# Patient Record
Sex: FEMALE | Race: WHITE | NOT HISPANIC OR LATINO | Employment: FULL TIME | URBAN - METROPOLITAN AREA
[De-identification: names, ages, dates, MRNs, and addresses within clinical notes are randomized per-mention and may not be internally consistent; named-entity substitution may affect disease eponyms.]

---

## 2022-01-10 LAB
LEFT EYE DM RETINOPATHY: NEGATIVE
RIGHT EYE DM RETINOPATHY: NEGATIVE

## 2022-01-20 ENCOUNTER — OFFICE VISIT (OUTPATIENT)
Dept: FAMILY MEDICINE | Facility: CLINIC | Age: 62
End: 2022-01-20
Payer: COMMERCIAL

## 2022-01-20 ENCOUNTER — LAB VISIT (OUTPATIENT)
Dept: LAB | Facility: HOSPITAL | Age: 62
End: 2022-01-20
Attending: FAMILY MEDICINE
Payer: COMMERCIAL

## 2022-01-20 VITALS
HEART RATE: 71 BPM | BODY MASS INDEX: 30.01 KG/M2 | WEIGHT: 180.13 LBS | SYSTOLIC BLOOD PRESSURE: 150 MMHG | DIASTOLIC BLOOD PRESSURE: 80 MMHG | OXYGEN SATURATION: 98 % | HEIGHT: 65 IN

## 2022-01-20 DIAGNOSIS — I10 ESSENTIAL HYPERTENSION: ICD-10-CM

## 2022-01-20 DIAGNOSIS — E78.2 MIXED HYPERLIPIDEMIA: ICD-10-CM

## 2022-01-20 DIAGNOSIS — G90.50 RSD (REFLEX SYMPATHETIC DYSTROPHY): ICD-10-CM

## 2022-01-20 DIAGNOSIS — K21.9 GASTROESOPHAGEAL REFLUX DISEASE WITHOUT ESOPHAGITIS: Primary | ICD-10-CM

## 2022-01-20 DIAGNOSIS — E11.9 TYPE 2 DIABETES MELLITUS WITHOUT COMPLICATION, WITHOUT LONG-TERM CURRENT USE OF INSULIN: ICD-10-CM

## 2022-01-20 LAB
ALBUMIN SERPL BCP-MCNC: 4.2 G/DL (ref 3.5–5.2)
ALP SERPL-CCNC: 102 U/L (ref 55–135)
ALT SERPL W/O P-5'-P-CCNC: 19 U/L (ref 10–44)
ANION GAP SERPL CALC-SCNC: 10 MMOL/L (ref 8–16)
AST SERPL-CCNC: 16 U/L (ref 10–40)
BASOPHILS # BLD AUTO: 0.03 K/UL (ref 0–0.2)
BASOPHILS NFR BLD: 0.4 % (ref 0–1.9)
BILIRUB SERPL-MCNC: 0.4 MG/DL (ref 0.1–1)
BUN SERPL-MCNC: 13 MG/DL (ref 6–20)
CALCIUM SERPL-MCNC: 11 MG/DL (ref 8.7–10.5)
CHLORIDE SERPL-SCNC: 104 MMOL/L (ref 95–110)
CHOLEST SERPL-MCNC: 262 MG/DL (ref 120–199)
CHOLEST/HDLC SERPL: 4.1 {RATIO} (ref 2–5)
CO2 SERPL-SCNC: 26 MMOL/L (ref 23–29)
CREAT SERPL-MCNC: 0.7 MG/DL (ref 0.5–1.4)
DIFFERENTIAL METHOD: NORMAL
EOSINOPHIL # BLD AUTO: 0.1 K/UL (ref 0–0.5)
EOSINOPHIL NFR BLD: 1.2 % (ref 0–8)
ERYTHROCYTE [DISTWIDTH] IN BLOOD BY AUTOMATED COUNT: 12.4 % (ref 11.5–14.5)
EST. GFR  (AFRICAN AMERICAN): >60 ML/MIN/1.73 M^2
EST. GFR  (NON AFRICAN AMERICAN): >60 ML/MIN/1.73 M^2
ESTIMATED AVG GLUCOSE: 126 MG/DL (ref 68–131)
GLUCOSE SERPL-MCNC: 117 MG/DL (ref 70–110)
HBA1C MFR BLD: 6 % (ref 4–5.6)
HCT VFR BLD AUTO: 38.7 % (ref 37–48.5)
HDLC SERPL-MCNC: 64 MG/DL (ref 40–75)
HDLC SERPL: 24.4 % (ref 20–50)
HGB BLD-MCNC: 12.4 G/DL (ref 12–16)
IMM GRANULOCYTES # BLD AUTO: 0.01 K/UL (ref 0–0.04)
IMM GRANULOCYTES NFR BLD AUTO: 0.1 % (ref 0–0.5)
LDLC SERPL CALC-MCNC: 169.8 MG/DL (ref 63–159)
LYMPHOCYTES # BLD AUTO: 3 K/UL (ref 1–4.8)
LYMPHOCYTES NFR BLD: 45 % (ref 18–48)
MCH RBC QN AUTO: 28.4 PG (ref 27–31)
MCHC RBC AUTO-ENTMCNC: 32 G/DL (ref 32–36)
MCV RBC AUTO: 89 FL (ref 82–98)
MONOCYTES # BLD AUTO: 0.5 K/UL (ref 0.3–1)
MONOCYTES NFR BLD: 8.1 % (ref 4–15)
NEUTROPHILS # BLD AUTO: 3 K/UL (ref 1.8–7.7)
NEUTROPHILS NFR BLD: 45.2 % (ref 38–73)
NONHDLC SERPL-MCNC: 198 MG/DL
NRBC BLD-RTO: 0 /100 WBC
PLATELET # BLD AUTO: 231 K/UL (ref 150–450)
PMV BLD AUTO: 11.7 FL (ref 9.2–12.9)
POTASSIUM SERPL-SCNC: 4.4 MMOL/L (ref 3.5–5.1)
PROT SERPL-MCNC: 7.2 G/DL (ref 6–8.4)
RBC # BLD AUTO: 4.37 M/UL (ref 4–5.4)
SODIUM SERPL-SCNC: 140 MMOL/L (ref 136–145)
TRIGL SERPL-MCNC: 141 MG/DL (ref 30–150)
WBC # BLD AUTO: 6.67 K/UL (ref 3.9–12.7)

## 2022-01-20 PROCEDURE — 99999 PR PBB SHADOW E&M-NEW PATIENT-LVL III: ICD-10-PCS | Mod: PBBFAC,,, | Performed by: FAMILY MEDICINE

## 2022-01-20 PROCEDURE — 85025 COMPLETE CBC W/AUTO DIFF WBC: CPT | Performed by: FAMILY MEDICINE

## 2022-01-20 PROCEDURE — 99999 PR PBB SHADOW E&M-NEW PATIENT-LVL III: CPT | Mod: PBBFAC,,, | Performed by: FAMILY MEDICINE

## 2022-01-20 PROCEDURE — 99204 PR OFFICE/OUTPT VISIT, NEW, LEVL IV, 45-59 MIN: ICD-10-PCS | Mod: S$GLB,,, | Performed by: FAMILY MEDICINE

## 2022-01-20 PROCEDURE — 83036 HEMOGLOBIN GLYCOSYLATED A1C: CPT | Performed by: FAMILY MEDICINE

## 2022-01-20 PROCEDURE — 99204 OFFICE O/P NEW MOD 45 MIN: CPT | Mod: S$GLB,,, | Performed by: FAMILY MEDICINE

## 2022-01-20 PROCEDURE — 80053 COMPREHEN METABOLIC PANEL: CPT | Performed by: FAMILY MEDICINE

## 2022-01-20 PROCEDURE — 36415 COLL VENOUS BLD VENIPUNCTURE: CPT | Mod: PO | Performed by: FAMILY MEDICINE

## 2022-01-20 PROCEDURE — 80061 LIPID PANEL: CPT | Performed by: FAMILY MEDICINE

## 2022-01-20 RX ORDER — METFORMIN HYDROCHLORIDE 500 MG/1
500 TABLET ORAL 2 TIMES DAILY
Qty: 180 TABLET | Refills: 3 | Status: SHIPPED | OUTPATIENT
Start: 2022-01-20 | End: 2023-04-25

## 2022-01-20 RX ORDER — OMEPRAZOLE 20 MG/1
20 CAPSULE, DELAYED RELEASE ORAL 2 TIMES DAILY
Qty: 180 CAPSULE | Refills: 3 | Status: SHIPPED | OUTPATIENT
Start: 2022-01-20 | End: 2023-01-16

## 2022-01-20 RX ORDER — IBUPROFEN, ACETAMINOPHEN 125; 250 MG/1; MG/1
TABLET, FILM COATED ORAL
COMMUNITY

## 2022-01-20 RX ORDER — ATORVASTATIN CALCIUM 40 MG/1
40 TABLET, FILM COATED ORAL DAILY
Qty: 90 TABLET | Refills: 3 | Status: SHIPPED | OUTPATIENT
Start: 2022-01-20 | End: 2022-01-24

## 2022-01-20 RX ORDER — ATORVASTATIN CALCIUM 40 MG/1
40 TABLET, FILM COATED ORAL DAILY
COMMUNITY
End: 2022-01-20 | Stop reason: SDUPTHER

## 2022-01-20 RX ORDER — OMEPRAZOLE 20 MG/1
CAPSULE, DELAYED RELEASE ORAL
COMMUNITY
Start: 2021-09-27 | End: 2022-01-20 | Stop reason: SDUPTHER

## 2022-01-20 RX ORDER — LOSARTAN POTASSIUM 50 MG/1
50 TABLET ORAL DAILY
COMMUNITY
Start: 2022-01-14 | End: 2022-01-20

## 2022-01-20 RX ORDER — LOSARTAN POTASSIUM 50 MG/1
50 TABLET ORAL DAILY
Qty: 90 TABLET | Refills: 3 | Status: SHIPPED | OUTPATIENT
Start: 2022-01-20 | End: 2023-02-06

## 2022-01-20 RX ORDER — ASPIRIN 81 MG/1
81 TABLET ORAL DAILY
COMMUNITY

## 2022-01-20 RX ORDER — METFORMIN HYDROCHLORIDE 500 MG/1
500 TABLET ORAL 2 TIMES DAILY
COMMUNITY
Start: 2022-01-14 | End: 2022-01-20 | Stop reason: SDUPTHER

## 2022-01-20 NOTE — PROGRESS NOTES
HPI: Just moved here. She has come to establish care.  Meds are do for refills.  All problems discussed.  She is assymptomatic for CAD and CHF.          Review of Systems   Constitutional: Negative.    HENT: Negative.    Eyes: Negative.    Respiratory: Negative.    Cardiovascular: Negative.    Gastrointestinal: Negative.    Genitourinary: Negative.    Musculoskeletal: Negative.    Skin: Negative.    Neurological: Negative.    Psychiatric/Behavioral: Negative.         Physical Exam  Constitutional:       Appearance: Normal appearance.   HENT:      Head: Normocephalic and atraumatic.      Nose: Nose normal.      Mouth/Throat:      Mouth: Mucous membranes are dry.      Pharynx: Oropharynx is clear.   Eyes:      Extraocular Movements: Extraocular movements intact.      Pupils: Pupils are equal, round, and reactive to light.   Cardiovascular:      Rate and Rhythm: Normal rate and regular rhythm.      Pulses: Normal pulses.      Heart sounds: Normal heart sounds.   Pulmonary:      Effort: Pulmonary effort is normal.      Breath sounds: Normal breath sounds.   Abdominal:      General: Abdomen is flat. Bowel sounds are normal.      Palpations: Abdomen is soft.   Musculoskeletal:         General: Normal range of motion.      Cervical back: Normal range of motion and neck supple.   Skin:     General: Skin is warm and dry.   Neurological:      General: No focal deficit present.      Mental Status: She is alert and oriented to person, place, and time.   Psychiatric:         Mood and Affect: Mood normal.         Behavior: Behavior normal.         Thought Content: Thought content normal.         Judgment: Judgment normal.          Gastroesophageal reflux disease without esophagitis  -     omeprazole (PRILOSEC) 20 MG capsule; Take 1 capsule (20 mg total) by mouth 2 (two) times a day.  Dispense: 180 capsule; Refill: 3    Mixed hyperlipidemia  -     atorvastatin (LIPITOR) 40 MG tablet; Take 1 tablet (40 mg total) by mouth once  daily. Take once daily  Dispense: 90 tablet; Refill: 3    Essential hypertension  -     losartan (COZAAR) 50 MG tablet; Take 1 tablet (50 mg total) by mouth once daily.  Dispense: 90 tablet; Refill: 3    Type 2 diabetes mellitus without complication, without long-term current use of insulin  -     metFORMIN (GLUCOPHAGE) 500 MG tablet; Take 1 tablet (500 mg total) by mouth 2 (two) times daily.  Dispense: 180 tablet; Refill: 3    RSD (reflex sympathetic dystrophy)  She needs a Handicap parking form.

## 2022-01-21 DIAGNOSIS — E11.9 TYPE 2 DIABETES MELLITUS WITHOUT COMPLICATION, WITHOUT LONG-TERM CURRENT USE OF INSULIN: ICD-10-CM

## 2022-01-21 DIAGNOSIS — E78.2 MIXED HYPERLIPIDEMIA: Primary | ICD-10-CM

## 2022-01-24 DIAGNOSIS — E78.2 MIXED HYPERLIPIDEMIA: Primary | ICD-10-CM

## 2022-01-24 RX ORDER — ROSUVASTATIN CALCIUM 20 MG/1
20 TABLET, COATED ORAL DAILY
Qty: 90 TABLET | Refills: 3 | Status: SHIPPED | OUTPATIENT
Start: 2022-01-24 | End: 2022-08-12 | Stop reason: ALTCHOICE

## 2022-07-07 ENCOUNTER — PATIENT OUTREACH (OUTPATIENT)
Dept: ADMINISTRATIVE | Facility: HOSPITAL | Age: 62
End: 2022-07-07
Payer: COMMERCIAL

## 2022-07-07 ENCOUNTER — PATIENT MESSAGE (OUTPATIENT)
Dept: ADMINISTRATIVE | Facility: HOSPITAL | Age: 62
End: 2022-07-07
Payer: COMMERCIAL

## 2022-07-07 NOTE — PROGRESS NOTES
2022 Care Everywhere updates requested and reviewed.  Immunizations reconciled. Media reports reviewed.  Duplicate HM overrides and  orders removed.  Overdue HM topic chart audit and/or requested.  Overdue lab testing linked to upcoming lab appointments if applies.    Lab daniel, and Mafengwo reviewed   Pap Smear and Lab testing     Not in Links 2022    DIS reviewed      Mammogram     WOG orders placed. New patient      Health Maintenance Due   Topic Date Due    Hepatitis C Screening  Never done    Cervical Cancer Screening  Never done    COVID-19 Vaccine (1) Never done    HIV Screening  Never done    TETANUS VACCINE  Never done    Mammogram  Never done    Colorectal Cancer Screening  Never done    Shingles Vaccine (1 of 2) Never done

## 2022-07-14 ENCOUNTER — LAB VISIT (OUTPATIENT)
Dept: LAB | Facility: HOSPITAL | Age: 62
End: 2022-07-14
Attending: FAMILY MEDICINE
Payer: COMMERCIAL

## 2022-07-14 DIAGNOSIS — E11.9 TYPE 2 DIABETES MELLITUS WITHOUT COMPLICATION, WITHOUT LONG-TERM CURRENT USE OF INSULIN: ICD-10-CM

## 2022-07-14 DIAGNOSIS — E78.2 MIXED HYPERLIPIDEMIA: ICD-10-CM

## 2022-07-14 LAB
CHOLEST SERPL-MCNC: 109 MG/DL (ref 120–199)
CHOLEST/HDLC SERPL: 2 {RATIO} (ref 2–5)
ESTIMATED AVG GLUCOSE: 131 MG/DL (ref 68–131)
HBA1C MFR BLD: 6.2 % (ref 4–5.6)
HDLC SERPL-MCNC: 54 MG/DL (ref 40–75)
HDLC SERPL: 49.5 % (ref 20–50)
LDLC SERPL CALC-MCNC: 32 MG/DL (ref 63–159)
NONHDLC SERPL-MCNC: 55 MG/DL
TRIGL SERPL-MCNC: 115 MG/DL (ref 30–150)

## 2022-07-14 PROCEDURE — 83036 HEMOGLOBIN GLYCOSYLATED A1C: CPT | Performed by: FAMILY MEDICINE

## 2022-07-14 PROCEDURE — 80061 LIPID PANEL: CPT | Performed by: FAMILY MEDICINE

## 2022-07-14 PROCEDURE — 36415 COLL VENOUS BLD VENIPUNCTURE: CPT | Mod: PO | Performed by: FAMILY MEDICINE

## 2022-07-19 ENCOUNTER — TELEPHONE (OUTPATIENT)
Dept: FAMILY MEDICINE | Facility: CLINIC | Age: 62
End: 2022-07-19
Payer: COMMERCIAL

## 2022-07-29 ENCOUNTER — HOSPITAL ENCOUNTER (OUTPATIENT)
Dept: RADIOLOGY | Facility: HOSPITAL | Age: 62
Discharge: HOME | End: 2022-07-29
Attending: FAMILY MEDICINE
Payer: COMMERCIAL

## 2022-07-29 ENCOUNTER — OFFICE VISIT (OUTPATIENT)
Dept: FAMILY MEDICINE | Facility: CLINIC | Age: 62
End: 2022-07-29
Payer: COMMERCIAL

## 2022-07-29 ENCOUNTER — TELEPHONE (OUTPATIENT)
Dept: FAMILY MEDICINE | Facility: CLINIC | Age: 62
End: 2022-07-29

## 2022-07-29 VITALS
HEIGHT: 65 IN | BODY MASS INDEX: 29.27 KG/M2 | OXYGEN SATURATION: 98 % | HEART RATE: 78 BPM | DIASTOLIC BLOOD PRESSURE: 64 MMHG | SYSTOLIC BLOOD PRESSURE: 122 MMHG | RESPIRATION RATE: 18 BRPM | WEIGHT: 175.69 LBS

## 2022-07-29 DIAGNOSIS — Z00.00 WELLNESS EXAMINATION: ICD-10-CM

## 2022-07-29 DIAGNOSIS — K21.9 GASTROESOPHAGEAL REFLUX DISEASE WITHOUT ESOPHAGITIS: ICD-10-CM

## 2022-07-29 DIAGNOSIS — I10 ESSENTIAL HYPERTENSION: ICD-10-CM

## 2022-07-29 DIAGNOSIS — M79.672 PAIN OF LEFT HEEL: ICD-10-CM

## 2022-07-29 DIAGNOSIS — R09.A2 GLOBUS PHARYNGEUS: Primary | ICD-10-CM

## 2022-07-29 DIAGNOSIS — E11.9 TYPE 2 DIABETES MELLITUS WITHOUT COMPLICATION, WITHOUT LONG-TERM CURRENT USE OF INSULIN: ICD-10-CM

## 2022-07-29 PROCEDURE — 73650 X-RAY EXAM OF HEEL: CPT | Mod: 26,LT,, | Performed by: RADIOLOGY

## 2022-07-29 PROCEDURE — 73650 XR CALCANEUS 2 VIEW LEFT: ICD-10-PCS | Mod: 26,LT,, | Performed by: RADIOLOGY

## 2022-07-29 PROCEDURE — 99999 PR PBB SHADOW E&M-EST. PATIENT-LVL V: ICD-10-PCS | Mod: PBBFAC,,, | Performed by: FAMILY MEDICINE

## 2022-07-29 PROCEDURE — 99999 PR PBB SHADOW E&M-EST. PATIENT-LVL V: CPT | Mod: PBBFAC,,, | Performed by: FAMILY MEDICINE

## 2022-07-29 PROCEDURE — 73650 X-RAY EXAM OF HEEL: CPT | Mod: TC,FY,PO,LT

## 2022-07-29 PROCEDURE — 99214 PR OFFICE/OUTPT VISIT, EST, LEVL IV, 30-39 MIN: ICD-10-PCS | Mod: S$GLB,,, | Performed by: FAMILY MEDICINE

## 2022-07-29 PROCEDURE — 99214 OFFICE O/P EST MOD 30 MIN: CPT | Mod: S$GLB,,, | Performed by: FAMILY MEDICINE

## 2022-07-29 RX ORDER — CARBOXYMETHYLCELLULOSE SODIUM 5 MG/ML
1 SOLUTION/ DROPS OPHTHALMIC 3 TIMES DAILY PRN
COMMUNITY

## 2022-07-29 NOTE — TELEPHONE ENCOUNTER
1:40pm:  Pt. Confirmed location of [facility] in [place], [place].  She requests that office request medical records.  Assured her that office will reach out to them to confirm fax #.  She vu.    2:06pm:  Unable to speak to representative to confirm facility's fax # for release of medical records.

## 2022-07-29 NOTE — PROGRESS NOTES
"Subjective:       Patient ID: Daina Britt is a 61 y.o. female.    Chief Complaint: Ankle Pain (Left ankle pain) and Results    Very pleasant 61-year-old patient returns today for follow-up with a couple of other problems.  The patient has reflux which is controlled currently on a PPI.  She does have globus sensation occasionally in her throat which has changed her ability to sing.  The patient's blood pressure is well controlled right now.  No symptoms of CAD or CHF.  Her diabetes type 2 is under excellent control under 6% A1c just recently checked this month.  Her most recent concerns are a nodular like structure on the left lateral heel which is causing her pain with walking.  It has been there for a while.  X-ray and podiatry referral suggested.    Review of Systems   Constitutional: Negative.    HENT: Positive for sore throat.    Eyes: Negative.    Respiratory: Negative.    Cardiovascular: Negative.    Gastrointestinal: Negative.    Endocrine: Negative.    Genitourinary: Negative.    Musculoskeletal: Negative.    Skin: Negative.    Allergic/Immunologic: Negative.    Neurological: Negative.    Hematological: Negative.    Psychiatric/Behavioral: Negative.        Objective:      Vitals:    07/29/22 0928   BP: 122/64   BP Location: Left arm   Patient Position: Sitting   Pulse: 78   Resp: 18   SpO2: 98%   Weight: 79.7 kg (175 lb 11.3 oz)   Height: 5' 5" (1.651 m)      Physical Exam  Vitals and nursing note reviewed.   Constitutional:       Appearance: Normal appearance. She is normal weight.   HENT:      Head: Normocephalic and atraumatic.      Nose: Nose normal.      Mouth/Throat:      Mouth: Mucous membranes are moist.      Pharynx: Oropharynx is clear.   Eyes:      Extraocular Movements: Extraocular movements intact.      Conjunctiva/sclera: Conjunctivae normal.      Pupils: Pupils are equal, round, and reactive to light.   Cardiovascular:      Rate and Rhythm: Normal rate and regular rhythm.      Pulses: " Normal pulses.      Heart sounds: Normal heart sounds.   Pulmonary:      Effort: Pulmonary effort is normal.      Breath sounds: Normal breath sounds.   Abdominal:      General: Abdomen is flat. Bowel sounds are normal.      Palpations: Abdomen is soft.   Musculoskeletal:         General: Normal range of motion.      Cervical back: Normal range of motion and neck supple.      Comments: Nodule on the left lateral heel that is mobile and tender to touch.   Skin:     General: Skin is warm and dry.      Capillary Refill: Capillary refill takes less than 2 seconds.   Neurological:      General: No focal deficit present.      Mental Status: She is alert and oriented to person, place, and time. Mental status is at baseline.   Psychiatric:         Mood and Affect: Mood normal.         Behavior: Behavior normal.         Thought Content: Thought content normal.         Judgment: Judgment normal.         Results for orders placed or performed in visit on 07/14/22   Lipid Panel   Result Value Ref Range    Cholesterol 109 (L) 120 - 199 mg/dL    Triglycerides 115 30 - 150 mg/dL    HDL 54 40 - 75 mg/dL    LDL Cholesterol 32.0 (L) 63.0 - 159.0 mg/dL    HDL/Cholesterol Ratio 49.5 20.0 - 50.0 %    Total Cholesterol/HDL Ratio 2.0 2.0 - 5.0    Non-HDL Cholesterol 55 mg/dL   Hemoglobin A1C   Result Value Ref Range    Hemoglobin A1C 6.2 (H) 4.0 - 5.6 %    Estimated Avg Glucose 131 68 - 131 mg/dL      Assessment:       1. Globus pharyngeus    2. Pain of left heel    3. Type 2 diabetes mellitus without complication, without long-term current use of insulin    4. Gastroesophageal reflux disease without esophagitis    5. Essential hypertension    6. Wellness examination        Plan:       Globus pharyngeus  -     Ambulatory referral/consult to ENT; Future; Expected date: 08/05/2022    Pain of left heel  -     X-Ray Calcaneus 2 View Left; Future; Expected date: 07/29/2022  -     Ambulatory referral/consult to Podiatry; Future; Expected date:  08/05/2022    Type 2 diabetes mellitus without complication, without long-term current use of insulin  Comments:  Controlled.  Hemoglobin A1c and a visit in 6 months recommended continue present plan    Gastroesophageal reflux disease without esophagitis  Comments:  For the most part controlled, globus may be occurring secondary to silent reflux however.  ENT referral set up.    Essential hypertension  Comments:  Blood pressure controlled continue current medications.  Reports values of her consistently over 140/90.    Wellness examination  -     Mammo Digital Diagnostic Bilat with Eb; Future; Expected date: 07/29/2022  -     Ambulatory referral/consult to Gynecology; Future; Expected date: 08/05/2022          Medication List with Changes/Refills   Current Medications    ASPIRIN (ECOTRIN) 81 MG EC TABLET    Take 81 mg by mouth once daily. Take one daily    CARBOXYMETHYLCELLULOSE (REFRESH PLUS) 0.5 % DPET    1 drop 3 (three) times daily as needed.    IBUPROFEN-ACETAMINOPHEN (ADVIL DUAL ACTION) 125-250 MG TAB    Take by mouth every 8 (eight) hours. Take 2 twice a day    LOSARTAN (COZAAR) 50 MG TABLET    Take 1 tablet (50 mg total) by mouth once daily.    METFORMIN (GLUCOPHAGE) 500 MG TABLET    Take 1 tablet (500 mg total) by mouth 2 (two) times daily.    OMEPRAZOLE (PRILOSEC) 20 MG CAPSULE    Take 1 capsule (20 mg total) by mouth 2 (two) times a day.    ROSUVASTATIN (CRESTOR) 20 MG TABLET    Take 1 tablet (20 mg total) by mouth once daily.

## 2022-08-01 ENCOUNTER — TELEPHONE (OUTPATIENT)
Dept: FAMILY MEDICINE | Facility: CLINIC | Age: 62
End: 2022-08-01
Payer: COMMERCIAL

## 2022-08-01 ENCOUNTER — PATIENT MESSAGE (OUTPATIENT)
Dept: ADMINISTRATIVE | Facility: HOSPITAL | Age: 62
End: 2022-08-01
Payer: COMMERCIAL

## 2022-08-03 DIAGNOSIS — E11.9 TYPE 2 DIABETES MELLITUS WITHOUT COMPLICATION: ICD-10-CM

## 2022-08-08 ENCOUNTER — PATIENT OUTREACH (OUTPATIENT)
Dept: ADMINISTRATIVE | Facility: HOSPITAL | Age: 62
End: 2022-08-08
Payer: COMMERCIAL

## 2022-08-08 ENCOUNTER — PATIENT MESSAGE (OUTPATIENT)
Dept: ADMINISTRATIVE | Facility: HOSPITAL | Age: 62
End: 2022-08-08
Payer: COMMERCIAL

## 2022-08-08 NOTE — LETTER
August 16, 2022    Diana Britt  [address] [place] [address]             [facility]  [address]  [facility] [address]  Phone: [phone] Dear Daina,      We have tried to reach you by [facility] [employer] email unsuccessfully.    [facility] wants to help patients achieve their health goals. Our records show that you may have been told you have diabetes.     Diabetes is a medical condition that needs to be managed all the time to reduce your risk of things like heart, kidney and eye disease. Your [facility] primary care team wants to be sure you get important tests and screenings done regularly to assure that your health needs are met.  If you believe this diagnosis of diabetes is in error, please let your primary care physician or care team know so that he/she can update your health record.     Our records indicate you may be overdue for the following:     Topic    Diabetes Urine Screening     Eye Exam          If you recently had any of the above test done at another facility, please let your primary care provider know so that they can update your health record.  Please send a message to your primary care physician via [email or web address] or contact his/her office at [phone]. If you have a copy of these records, please provide a copy so that we may update your records.  Also, You are welcome to request that the report be faxed to us at ([phone]).       If you have an upcoming scheduled appointment for the above test and/or procedures, please disregard this letter.  Otherwise, please send a message via [email or web address] or by calling [phone] and we will assist in scheduling these appointments for you.     Sincerely,     Ervin Garnica MD and your [facility] Primary Care Team

## 2022-08-08 NOTE — PROGRESS NOTES
WEEKLY BULK ORDER REPORT.  ACTIVE PATIENT PORTAL MESSAGE OR LETTER SENT        DIABETIC LAB TESTING- , URINE MICRO,     ALSO DUE FOR EYE EXAM

## 2022-08-09 ENCOUNTER — TELEPHONE (OUTPATIENT)
Dept: PODIATRY | Facility: CLINIC | Age: 62
End: 2022-08-09

## 2022-08-09 ENCOUNTER — OFFICE VISIT (OUTPATIENT)
Dept: PODIATRY | Facility: CLINIC | Age: 62
End: 2022-08-09
Payer: COMMERCIAL

## 2022-08-09 VITALS — HEIGHT: 65 IN | BODY MASS INDEX: 29.16 KG/M2 | WEIGHT: 175 LBS

## 2022-08-09 DIAGNOSIS — M79.89 SOFT TISSUE MASS: ICD-10-CM

## 2022-08-09 DIAGNOSIS — M79.672 PAIN OF LEFT HEEL: ICD-10-CM

## 2022-08-09 DIAGNOSIS — M79.89 MASS OF SOFT TISSUE OF FOOT: Primary | ICD-10-CM

## 2022-08-09 PROCEDURE — 99204 OFFICE O/P NEW MOD 45 MIN: CPT | Mod: S$GLB,,, | Performed by: PODIATRIST

## 2022-08-09 PROCEDURE — 99204 PR OFFICE/OUTPT VISIT, NEW, LEVL IV, 45-59 MIN: ICD-10-PCS | Mod: S$GLB,,, | Performed by: PODIATRIST

## 2022-08-09 NOTE — PATIENT INSTRUCTIONS
PLANTAR FIBROMA    What is a Plantar Fibroma?   A plantar fibroma is a fibrous knot (nodule) in the arch of the foot. It is embedded within the plantar fascia, a band of tissue that extends from the heel to the toes on the bottom of the foot. A plantar fibroma can develop in one or both feet, is benign (non-malignant), and usually will not go away or get smaller without treatment.    Definitive causes for this condition have not been clearly identified.      Signs and Symptoms  The characteristic sign of a plantar fibroma is a noticeable lump in the arch that feels firm to the touch. This mass can remain the same size or get larger over time, or additional fibromas may develop.  People who have a plantar fibroma may or may not have pain. When pain does occur, it is often caused by shoes pushing against the lump in the arch, although it can also arise when walking or standing barefoot.      Diagnosis  To diagnose a plantar fibroma, the foot and ankle surgeon will examine the foot and press on the affected area. Sometimes this can produce pain that extends down to the toes. An MRI or biopsy may be performed to further evaluate the lump and aid in diagnosis.          Treatment Options  Non-surgical treatment may help relieve the pain of a plantar fibroma, although it will not make the mass disappear. The foot and ankle surgeon may select one or more of the following non-surgical options:  Steroid injections. Injecting corticosteroid medication into the mass may help shrink it and thereby relieve the pain that occurs when walking. This reduction may be only temporary and the fibroma could slowly return to its original size.  Orthotic devices. If the fibroma is stable, meaning it is not changing in size, custom orthotic devices (shoe inserts) may relieve the pain by distributing the patients weight away from the fibroma.  Physical therapy. The pain is sometimes treated through physical therapy methods that deliver  anti-inflammatory medication into the fibroma without the need for injection.    If the mass increases in size or pain, the patient should be further evaluated. Surgical treatment to remove the fibroma is considered if the patient continues to experience pain following non-surgical approaches.    Surgical removal of a plantar fibroma may result in a flattening of the arch or development of hammertoes. Orthotic devices may be prescribed to provide support to the foot. Due to the high incidence of recurrence with this condition, continued follow-up with the foot and ankle surgeon is recommended.    Physical therapy. Ultrasound therapy and exercises may help rehabilitate the tendon and muscle following immobilization.  Medications . Nonsteroidal anti inflammatory drugs (NSAIDs), such as ibuprofen, help reduce the pain and inflammation.  Shoe modifications. Your foot and ankle surgeon may advise you on changes to make with your shoes and may provide special inserts designed to improve arch support.      When Is Surgery Needed?  In cases of PTTD that have progressed substantially or have failed to improve with non-surgical treatment, surgery may be required  For some advanced cases, surgery may be the only option. Surgical treatment may include repairing the tendon, realigning the bones of the foot, or both. Your foot and ankle surgeon will determine the best approach for your specific case.

## 2022-08-09 NOTE — PROGRESS NOTES
"  [address]  [place] [address]  Phone: [phone]   [phone]    Patient's PCP:Ervin Garnica MD  Referring Provider: Dr. Ervin Garnica    Subjective:      Chief Complaint:: Heel Pain (Left heel pain)    LIZZ Britt is a 61 y.o. female who presents today with a complaint of left heel pain lasting since 2017. Patient just started feeling the pain over the top of her foot, as well as heel just recently and reports no trauma.  Current symptoms include piercing pain and aching after the end of the day.  Aggravating factors are walking and standing long periods of time. Symptoms have progressed. Treatment to date have included Ibuprofen everyday for years with no relief.     Systemic Doctor: Dr. Garnica  Date Last Seen: 07/29/2022  Blood Sugar: 109  Hemoglobin A1c: 6.2    Vitals:    08/09/22 1344   Weight: 79.4 kg (175 lb)   Height: 5' 5" (1.651 m)   PainSc:   4      Shoe Size: 8    History reviewed. No pertinent surgical history.  History reviewed. No pertinent past medical history.  History reviewed. No pertinent family history.     Social History:   Marital Status: Single  Alcohol History:  reports no history of alcohol use.  Tobacco History:  reports that she has never smoked. She has never used smokeless tobacco.  Drug History:  reports no history of drug use.    Review of patient's allergies indicates:   Allergen Reactions    Titanium analogues Swelling     Titanium screws     Cortisone Other (See Comments)     Cortisone injection  (RSDS)(CRPS)    Codeine Nausea And Vomiting and Rash    Darvon [propoxyphene] Nausea And Vomiting and Rash    Demerol [meperidine] Nausea And Vomiting and Rash    Opioids - morphine analogues Nausea And Vomiting and Rash    Paragoric Nausea And Vomiting and Rash    Wygesic Nausea And Vomiting and Rash       Current Outpatient Medications   Medication Sig Dispense Refill    aspirin (ECOTRIN) 81 MG EC tablet Take 81 mg by mouth once daily. " Take one daily      carboxymethylcellulose (REFRESH PLUS) 0.5 % Dpet 1 drop 3 (three) times daily as needed.      ibuprofen-acetaminophen (ADVIL DUAL ACTION) 125-250 mg Tab Take by mouth every 8 (eight) hours. Take 2 twice a day      losartan (COZAAR) 50 MG tablet Take 1 tablet (50 mg total) by mouth once daily. 90 tablet 3    metFORMIN (GLUCOPHAGE) 500 MG tablet Take 1 tablet (500 mg total) by mouth 2 (two) times daily. 180 tablet 3    omeprazole (PRILOSEC) 20 MG capsule Take 1 capsule (20 mg total) by mouth 2 (two) times a day. 180 capsule 3    rosuvastatin (CRESTOR) 20 MG tablet Take 1 tablet (20 mg total) by mouth once daily. 90 tablet 3     No current facility-administered medications for this visit.       Review of Systems   Constitutional: Negative for chills, fatigue, fever and unexpected weight change.   HENT: Negative for hearing loss and trouble swallowing.    Eyes: Negative for photophobia and visual disturbance.   Respiratory: Negative for cough, shortness of breath and wheezing.    Cardiovascular: Negative for chest pain, palpitations and leg swelling.   Gastrointestinal: Negative for abdominal pain and nausea.   Genitourinary: Negative for dysuria and frequency.   Musculoskeletal: Negative for arthralgias, back pain, gait problem, joint swelling and myalgias.   Skin: Negative for rash and wound.   Neurological: Negative for tremors, seizures, weakness, numbness and headaches.   Hematological: Does not bruise/bleed easily.         Objective:        Physical Exam:   Foot Exam    General  General Appearance: appears stated age and healthy   Orientation: alert and oriented to person, place, and time   Affect: appropriate   Gait: antalgic       Left Foot/Ankle      Inspection and Palpation  Ecchymosis: none  Tenderness: (Plantar lateral heel)  Swelling: none   Arch: normal  Hammertoes: absent  Claw toes: absent  Hallux valgus: no  Hallux limitus: no  Skin Exam: skin intact; no drainage, no ulcer and  no erythema   Neurovascular  Dorsalis pedis: 2+  Posterior tibial: 2+  Capillary refill: 2+  Varicose veins: not present  Saphenous nerve sensation: normal  Tibial nerve sensation: normal  Superficial peroneal nerve sensation: normal  Deep peroneal nerve sensation: normal  Sural nerve sensation: normal    Muscle Strength  Ankle dorsiflexion: 5  Ankle plantar flexion: 5  Ankle inversion: 5  Ankle eversion: 5  Great toe extension: 5  Great toe flexion: 5    Range of Motion    Passive  Ankle dorsiflexion: 0  Ankle eversion: 0  Ankle inversion: 0      Tests  Anterior drawer: negative   Talar tilt: negative   PT Tinel's sign: negative  Paresthesia: negative        Physical Exam  Cardiovascular:      Pulses:           Dorsalis pedis pulses are 2+ on the left side.        Posterior tibial pulses are 2+ on the left side.   Musculoskeletal:      Left foot: No bunion.        Feet:    Feet:      Left foot:      Skin integrity: No ulcer or erythema.                   Muscle Strength   Left Lower Extremity   Ankle Dorsiflexion:  5   Plantar flexion:  5/5     Vascular Exam       Left Pulses  Dorsalis Pedis:      2+  Posterior Tibial:      2+             Imaging: X-Ray Calcaneus 2 View Left  Narrative: EXAMINATION:  XR CALCANEUS 2 VIEW LEFT    CLINICAL HISTORY:  Pain in left foot    TECHNIQUE:  Tangential and lateral views of the left calcaneus were performed.    COMPARISON:  None    FINDINGS:  There are postoperative changes of left posterior subtalar joint arthrodesis utilizing 2 obliquely oriented orthopedic screws.  There is advanced degenerative change of the posterior subtalar joint.  No radiographically evident acute fracture or osseous destructive process.  There are possible postoperative changes of prior left ankle joint arthrodesis with good bony bridging noted across the ankle joint.  There is degenerative change of the talonavicular joint.  Impression: As above    Electronically signed by: Jasvir Lino  MD  Date:    07/29/2022  Time:    12:15             Assessment:       1. Mass of soft tissue of foot    2. Pain of left heel      Plan:   Mass of soft tissue of foot    Pain of left heel  -     Ambulatory referral/consult to Podiatry      Follow up Patient will be scheduled for outpatient surgery.    Procedures        We discussed different conservative and surgical treatment options for the patient's soft tissue mass.  Patient states that given the chronic nature of the problem as well as conservative treatments tried she wished to proceed with surgical intervention.    I discussed with the patient findings of soft tissue mass of her posterior lateral heel.  Discussed that based on physical exam is likely a fibroma.  We discussed different conservative and surgical treatment options.  At this time patient wishes to proceed with surgical intervention.  I did discussed with patient increased risk with her surgery due to her history of RSD on the same foot.    Patient was educated about the entire pre, dorene and post-operative time period.  They  were educated about the pro's and con's of surgery.  All conservative measures have been exhausted. Patient understands the particular risks involved which may occur in connection with the procedure proposed including pain, swelling, infection, stiffness, decreased ROM, recurrence, rejection, numbness, delayed healing, scar formation.    Patient was educated that their diagnosis may be surgically treated.  The patient's problem will probably advance and usually will not get better without surgery.  All of the pre-operative treatment plans have been exhausted or will no longer be successful at this point in time.  Patient was told of the possible outcomes and expectations of the surgical procedure.  They  will need to be followed-up post-operatively.  Today, pictures were drawn, questions answered.      We discussed the following surgical procedures:  Excision of soft tissue  mass left foot       Counseling:     I provided patient education verbally regarding:   Patient diagnosis, treatment options, as well as alternatives, risks, and benefits.     This note was created using Dragon voice recognition software that occasionally misinterpreted phrases or words.

## 2022-08-09 NOTE — H&P (VIEW-ONLY)
"  [address] [facility] [address]  [place] [address]  Phone: [phone]   [phone]    Patient's PCP:Ervin Garnica MD  Referring Provider: Dr. Ervin Garnica    Subjective:      Chief Complaint:: Heel Pain (Left heel pain)    LIZZ Britt is a 61 y.o. female who presents today with a complaint of left heel pain lasting since 2017. Patient just started feeling the pain over the top of her foot, as well as heel just recently and reports no trauma.  Current symptoms include piercing pain and aching after the end of the day.  Aggravating factors are walking and standing long periods of time. Symptoms have progressed. Treatment to date have included Ibuprofen everyday for years with no relief.     Systemic Doctor: Dr. Garnica  Date Last Seen: 07/29/2022  Blood Sugar: 109  Hemoglobin A1c: 6.2    Vitals:    08/09/22 1344   Weight: 79.4 kg (175 lb)   Height: 5' 5" (1.651 m)   PainSc:   4      Shoe Size: 8    History reviewed. No pertinent surgical history.  History reviewed. No pertinent past medical history.  History reviewed. No pertinent family history.     Social History:   Marital Status: Single  Alcohol History:  reports no history of alcohol use.  Tobacco History:  reports that she has never smoked. She has never used smokeless tobacco.  Drug History:  reports no history of drug use.    Review of patient's allergies indicates:   Allergen Reactions    Titanium analogues Swelling     Titanium screws     Cortisone Other (See Comments)     Cortisone injection  (RSDS)(CRPS)    Codeine Nausea And Vomiting and Rash    Darvon [propoxyphene] Nausea And Vomiting and Rash    Demerol [meperidine] Nausea And Vomiting and Rash    Opioids - morphine analogues Nausea And Vomiting and Rash    Paragoric Nausea And Vomiting and Rash    Wygesic Nausea And Vomiting and Rash       Current Outpatient Medications   Medication Sig Dispense Refill    aspirin (ECOTRIN) 81 MG EC tablet Take 81 mg by mouth once daily. " Take one daily      carboxymethylcellulose (REFRESH PLUS) 0.5 % Dpet 1 drop 3 (three) times daily as needed.      ibuprofen-acetaminophen (ADVIL DUAL ACTION) 125-250 mg Tab Take by mouth every 8 (eight) hours. Take 2 twice a day      losartan (COZAAR) 50 MG tablet Take 1 tablet (50 mg total) by mouth once daily. 90 tablet 3    metFORMIN (GLUCOPHAGE) 500 MG tablet Take 1 tablet (500 mg total) by mouth 2 (two) times daily. 180 tablet 3    omeprazole (PRILOSEC) 20 MG capsule Take 1 capsule (20 mg total) by mouth 2 (two) times a day. 180 capsule 3    rosuvastatin (CRESTOR) 20 MG tablet Take 1 tablet (20 mg total) by mouth once daily. 90 tablet 3     No current facility-administered medications for this visit.       Review of Systems   Constitutional: Negative for chills, fatigue, fever and unexpected weight change.   HENT: Negative for hearing loss and trouble swallowing.    Eyes: Negative for photophobia and visual disturbance.   Respiratory: Negative for cough, shortness of breath and wheezing.    Cardiovascular: Negative for chest pain, palpitations and leg swelling.   Gastrointestinal: Negative for abdominal pain and nausea.   Genitourinary: Negative for dysuria and frequency.   Musculoskeletal: Negative for arthralgias, back pain, gait problem, joint swelling and myalgias.   Skin: Negative for rash and wound.   Neurological: Negative for tremors, seizures, weakness, numbness and headaches.   Hematological: Does not bruise/bleed easily.         Objective:        Physical Exam:   Foot Exam    General  General Appearance: appears stated age and healthy   Orientation: alert and oriented to person, place, and time   Affect: appropriate   Gait: antalgic       Left Foot/Ankle      Inspection and Palpation  Ecchymosis: none  Tenderness: (Plantar lateral heel)  Swelling: none   Arch: normal  Hammertoes: absent  Claw toes: absent  Hallux valgus: no  Hallux limitus: no  Skin Exam: skin intact; no drainage, no ulcer and  no erythema   Neurovascular  Dorsalis pedis: 2+  Posterior tibial: 2+  Capillary refill: 2+  Varicose veins: not present  Saphenous nerve sensation: normal  Tibial nerve sensation: normal  Superficial peroneal nerve sensation: normal  Deep peroneal nerve sensation: normal  Sural nerve sensation: normal    Muscle Strength  Ankle dorsiflexion: 5  Ankle plantar flexion: 5  Ankle inversion: 5  Ankle eversion: 5  Great toe extension: 5  Great toe flexion: 5    Range of Motion    Passive  Ankle dorsiflexion: 0  Ankle eversion: 0  Ankle inversion: 0      Tests  Anterior drawer: negative   Talar tilt: negative   PT Tinel's sign: negative  Paresthesia: negative        Physical Exam  Cardiovascular:      Pulses:           Dorsalis pedis pulses are 2+ on the left side.        Posterior tibial pulses are 2+ on the left side.   Musculoskeletal:      Left foot: No bunion.        Feet:    Feet:      Left foot:      Skin integrity: No ulcer or erythema.                   Muscle Strength   Left Lower Extremity   Ankle Dorsiflexion:  5   Plantar flexion:  5/5     Vascular Exam       Left Pulses  Dorsalis Pedis:      2+  Posterior Tibial:      2+             Imaging: X-Ray Calcaneus 2 View Left  Narrative: EXAMINATION:  XR CALCANEUS 2 VIEW LEFT    CLINICAL HISTORY:  Pain in left foot    TECHNIQUE:  Tangential and lateral views of the left calcaneus were performed.    COMPARISON:  None    FINDINGS:  There are postoperative changes of left posterior subtalar joint arthrodesis utilizing 2 obliquely oriented orthopedic screws.  There is advanced degenerative change of the posterior subtalar joint.  No radiographically evident acute fracture or osseous destructive process.  There are possible postoperative changes of prior left ankle joint arthrodesis with good bony bridging noted across the ankle joint.  There is degenerative change of the talonavicular joint.  Impression: As above    Electronically signed by: Jasvir Lino  MD  Date:    07/29/2022  Time:    12:15             Assessment:       1. Mass of soft tissue of foot    2. Pain of left heel      Plan:   Mass of soft tissue of foot    Pain of left heel  -     Ambulatory referral/consult to Podiatry      Follow up Patient will be scheduled for outpatient surgery.    Procedures        We discussed different conservative and surgical treatment options for the patient's soft tissue mass.  Patient states that given the chronic nature of the problem as well as conservative treatments tried she wished to proceed with surgical intervention.    I discussed with the patient findings of soft tissue mass of her posterior lateral heel.  Discussed that based on physical exam is likely a fibroma.  We discussed different conservative and surgical treatment options.  At this time patient wishes to proceed with surgical intervention.  I did discussed with patient increased risk with her surgery due to her history of RSD on the same foot.    Patient was educated about the entire pre, dorene and post-operative time period.  They  were educated about the pro's and con's of surgery.  All conservative measures have been exhausted. Patient understands the particular risks involved which may occur in connection with the procedure proposed including pain, swelling, infection, stiffness, decreased ROM, recurrence, rejection, numbness, delayed healing, scar formation.    Patient was educated that their diagnosis may be surgically treated.  The patient's problem will probably advance and usually will not get better without surgery.  All of the pre-operative treatment plans have been exhausted or will no longer be successful at this point in time.  Patient was told of the possible outcomes and expectations of the surgical procedure.  They  will need to be followed-up post-operatively.  Today, pictures were drawn, questions answered.      We discussed the following surgical procedures:  Excision of soft tissue  mass left foot       Counseling:     I provided patient education verbally regarding:   Patient diagnosis, treatment options, as well as alternatives, risks, and benefits.     This note was created using Dragon voice recognition software that occasionally misinterpreted phrases or words.

## 2022-08-10 ENCOUNTER — PATIENT MESSAGE (OUTPATIENT)
Dept: ADMINISTRATIVE | Facility: HOSPITAL | Age: 62
End: 2022-08-10
Payer: COMMERCIAL

## 2022-08-10 ENCOUNTER — PATIENT OUTREACH (OUTPATIENT)
Dept: ADMINISTRATIVE | Facility: HOSPITAL | Age: 62
End: 2022-08-10
Payer: COMMERCIAL

## 2022-08-10 DIAGNOSIS — Z12.11 COLON CANCER SCREENING: ICD-10-CM

## 2022-08-10 NOTE — PROGRESS NOTES
COLON CANCER SCREENING  Non-compliant report chart audits for COLON CANCER SCREENING for Patients     Outreach to patient in reference to a COLON CANCER SCREENING  Chart review completed - Care Everywhere and Media reports - updates requested and reviewed.      ACTIVE PORTAL MESSAGE SENT

## 2022-08-12 ENCOUNTER — HOSPITAL ENCOUNTER (OUTPATIENT)
Dept: PREADMISSION TESTING | Facility: HOSPITAL | Age: 62
Discharge: HOME | End: 2022-08-12
Attending: PODIATRIST
Payer: COMMERCIAL

## 2022-08-12 ENCOUNTER — HOSPITAL ENCOUNTER (OUTPATIENT)
Dept: RADIOLOGY | Facility: HOSPITAL | Age: 62
Discharge: HOME | End: 2022-08-12
Attending: PODIATRIST
Payer: COMMERCIAL

## 2022-08-12 VITALS
OXYGEN SATURATION: 97 % | HEIGHT: 66 IN | TEMPERATURE: 98 F | DIASTOLIC BLOOD PRESSURE: 96 MMHG | RESPIRATION RATE: 18 BRPM | HEART RATE: 59 BPM | BODY MASS INDEX: 28.12 KG/M2 | SYSTOLIC BLOOD PRESSURE: 182 MMHG | WEIGHT: 175 LBS

## 2022-08-12 DIAGNOSIS — M79.672 PAIN OF LEFT HEEL: ICD-10-CM

## 2022-08-12 DIAGNOSIS — Z01.818 PRE-OP TESTING: Primary | ICD-10-CM

## 2022-08-12 DIAGNOSIS — M79.89 MASS OF SOFT TISSUE OF FOOT: ICD-10-CM

## 2022-08-12 LAB
ALBUMIN SERPL BCP-MCNC: 4.4 G/DL (ref 3.5–5.2)
ALP SERPL-CCNC: 76 U/L (ref 55–135)
ALT SERPL W/O P-5'-P-CCNC: 25 U/L (ref 10–44)
ANION GAP SERPL CALC-SCNC: 7 MMOL/L (ref 8–16)
AST SERPL-CCNC: 22 U/L (ref 10–40)
BASOPHILS # BLD AUTO: 0.03 K/UL (ref 0–0.2)
BASOPHILS NFR BLD: 0.6 % (ref 0–1.9)
BILIRUB SERPL-MCNC: 0.7 MG/DL (ref 0.1–1)
BUN SERPL-MCNC: 15 MG/DL (ref 6–20)
CALCIUM SERPL-MCNC: 9.9 MG/DL (ref 8.7–10.5)
CHLORIDE SERPL-SCNC: 106 MMOL/L (ref 95–110)
CO2 SERPL-SCNC: 28 MMOL/L (ref 23–29)
CREAT SERPL-MCNC: 0.6 MG/DL (ref 0.5–1.4)
DIFFERENTIAL METHOD: NORMAL
EOSINOPHIL # BLD AUTO: 0.1 K/UL (ref 0–0.5)
EOSINOPHIL NFR BLD: 1.5 % (ref 0–8)
ERYTHROCYTE [DISTWIDTH] IN BLOOD BY AUTOMATED COUNT: 12.5 % (ref 11.5–14.5)
EST. GFR  (NO RACE VARIABLE): >60 ML/MIN/1.73 M^2
GLUCOSE SERPL-MCNC: 112 MG/DL (ref 70–110)
HCT VFR BLD AUTO: 40.4 % (ref 37–48.5)
HGB BLD-MCNC: 13 G/DL (ref 12–16)
IMM GRANULOCYTES # BLD AUTO: 0.01 K/UL (ref 0–0.04)
IMM GRANULOCYTES NFR BLD AUTO: 0.2 % (ref 0–0.5)
LYMPHOCYTES # BLD AUTO: 2 K/UL (ref 1–4.8)
LYMPHOCYTES NFR BLD: 37.4 % (ref 18–48)
MCH RBC QN AUTO: 28.5 PG (ref 27–31)
MCHC RBC AUTO-ENTMCNC: 32.2 G/DL (ref 32–36)
MCV RBC AUTO: 89 FL (ref 82–98)
MONOCYTES # BLD AUTO: 0.4 K/UL (ref 0.3–1)
MONOCYTES NFR BLD: 7.2 % (ref 4–15)
NEUTROPHILS # BLD AUTO: 2.9 K/UL (ref 1.8–7.7)
NEUTROPHILS NFR BLD: 53.1 % (ref 38–73)
NRBC BLD-RTO: 0 /100 WBC
PLATELET # BLD AUTO: 188 K/UL (ref 150–450)
PMV BLD AUTO: 10.9 FL (ref 9.2–12.9)
POTASSIUM SERPL-SCNC: 4.4 MMOL/L (ref 3.5–5.1)
PROT SERPL-MCNC: 7.1 G/DL (ref 6–8.4)
RBC # BLD AUTO: 4.56 M/UL (ref 4–5.4)
SODIUM SERPL-SCNC: 141 MMOL/L (ref 136–145)
WBC # BLD AUTO: 5.45 K/UL (ref 3.9–12.7)

## 2022-08-12 PROCEDURE — 80053 COMPREHEN METABOLIC PANEL: CPT | Performed by: PODIATRIST

## 2022-08-12 PROCEDURE — 71046 X-RAY EXAM CHEST 2 VIEWS: CPT | Mod: TC

## 2022-08-12 PROCEDURE — 93005 ELECTROCARDIOGRAM TRACING: CPT | Performed by: INTERNAL MEDICINE

## 2022-08-12 PROCEDURE — 36415 COLL VENOUS BLD VENIPUNCTURE: CPT | Performed by: PODIATRIST

## 2022-08-12 PROCEDURE — 85025 COMPLETE CBC W/AUTO DIFF WBC: CPT | Performed by: PODIATRIST

## 2022-08-12 PROCEDURE — 93010 EKG 12-LEAD: ICD-10-PCS | Mod: ,,, | Performed by: INTERNAL MEDICINE

## 2022-08-12 PROCEDURE — 93010 ELECTROCARDIOGRAM REPORT: CPT | Mod: ,,, | Performed by: INTERNAL MEDICINE

## 2022-08-12 RX ORDER — ATORVASTATIN CALCIUM 40 MG/1
40 TABLET, FILM COATED ORAL NIGHTLY
COMMUNITY
End: 2023-01-27 | Stop reason: ALTCHOICE

## 2022-08-12 NOTE — DISCHARGE INSTRUCTIONS
To confirm, Your doctor has instructed you that surgery is scheduled for:   Tuesday, August 16, 2022    Pre-Op will call the afternoon prior to surgery between 4:00 and 6:00 PM with the final arrival time.    Monday, August 17, 2022    Please report to [facility] [place] via [facility] entrance. Check in at registration desk.    Do not eat or drink anything after midnight the night before your surgery - THIS INCLUDES  WATER, GUM, MINTS AND CANDY.  YOU MAY BRUSH YOUR TEETH BUT DO NOT SWALLOW     TAKE ONLY THESE MEDICATIONS WITH A SMALL SIP OF WATER THE MORNING OF YOUR PROCEDURE:  OMEPRAZOLE    ONLY if you are diabetic, check your sugar in the morning before your procedure.       Do not take any diabetic medicines or insulin the morning of surgery .     PLEASE NOTE:  The surgery schedule has many variables which may affect the time of your surgery case.  Family members should be available if your surgery time changes.  Plan to be here the day of your procedure between 4-6 hours.      DO NOT TAKE THESE MEDICATIONS 5-7 DAYS PRIOR to your procedure or per your surgeon's request: ASPIRIN, ALEVE, ADVIL, IBUPROFEN,  TOMAS SELTZER, BC , FISH OIL , VITAMIN E, HERBALS  (May take Tylenol)                                                          IMPORTANT INSTRUCTIONS    Shower the night before AND the morning of your procedure with a Chlorhexidine wash such as Hibiclens or Dial antibacterial soap from the neck down. Do not apply any deodorants, lotions or powders after each shower.  Do not get it on your face or in your eyes.  You may use your own shampoo and face wash. This helps your skin to be as bacteria free as possible.  DO NOT remove hair from the surgery site.  Do not shave the incision site unless you are given specific instructions to do so.    Sleep in a bed with clean sheets.  Do not sleep with a pet in the bed.   Please leave all jewelry, piercing's and valuables at home.     Make arrangements in advance for  transportation home by a responsible adult.    You must make arrangements for transportation, TAXI'S, UBER'S OR LYFTS ARE NOT ALLOWED.      If you have any questions about these instructions, call Pre-Op Admit  Nursing at [phone] or the Pre-Op Day Surgery Unit at [phone].

## 2022-08-15 ENCOUNTER — ANESTHESIA EVENT (OUTPATIENT)
Dept: SURGERY | Facility: HOSPITAL | Age: 62
End: 2022-08-15
Payer: COMMERCIAL

## 2022-08-16 ENCOUNTER — HOSPITAL ENCOUNTER (OUTPATIENT)
Facility: HOSPITAL | Age: 62
Discharge: HOME | End: 2022-08-16
Attending: PODIATRIST | Admitting: PODIATRIST
Payer: COMMERCIAL

## 2022-08-16 ENCOUNTER — ANESTHESIA (OUTPATIENT)
Dept: SURGERY | Facility: HOSPITAL | Age: 62
End: 2022-08-16
Payer: COMMERCIAL

## 2022-08-16 VITALS
SYSTOLIC BLOOD PRESSURE: 130 MMHG | BODY MASS INDEX: 28.09 KG/M2 | DIASTOLIC BLOOD PRESSURE: 70 MMHG | RESPIRATION RATE: 14 BRPM | TEMPERATURE: 98 F | HEART RATE: 64 BPM | HEIGHT: 66 IN | OXYGEN SATURATION: 96 % | WEIGHT: 174.81 LBS

## 2022-08-16 DIAGNOSIS — M79.89 MASS OF SOFT TISSUE OF FOOT: ICD-10-CM

## 2022-08-16 DIAGNOSIS — M79.672 PAIN OF LEFT HEEL: ICD-10-CM

## 2022-08-16 DIAGNOSIS — Z01.818 PRE-OP TESTING: ICD-10-CM

## 2022-08-16 DIAGNOSIS — M79.89 SOFT TISSUE MASS: ICD-10-CM

## 2022-08-16 LAB
GLUCOSE SERPL-MCNC: 111 MG/DL (ref 70–110)
SARS-COV-2 RDRP RESP QL NAA+PROBE: NEGATIVE

## 2022-08-16 PROCEDURE — 63600175 PHARM REV CODE 636 W HCPCS: Performed by: PODIATRIST

## 2022-08-16 PROCEDURE — 37000008 HC ANESTHESIA 1ST 15 MINUTES: Performed by: PODIATRIST

## 2022-08-16 PROCEDURE — 28045 PR EXC TUMOR SOFT TISSUE FOOT/TOE SUBFASC <1.5CM: ICD-10-PCS | Mod: LT,,, | Performed by: PODIATRIST

## 2022-08-16 PROCEDURE — 28045 EXC FOOT/TOE TUM DEEP <1.5CM: CPT | Mod: LT,,, | Performed by: PODIATRIST

## 2022-08-16 PROCEDURE — 25000003 PHARM REV CODE 250: Performed by: NURSE ANESTHETIST, CERTIFIED REGISTERED

## 2022-08-16 PROCEDURE — 36000707: Performed by: PODIATRIST

## 2022-08-16 PROCEDURE — 27201423 OPTIME MED/SURG SUP & DEVICES STERILE SUPPLY: Performed by: PODIATRIST

## 2022-08-16 PROCEDURE — 71000015 HC POSTOP RECOV 1ST HR: Performed by: PODIATRIST

## 2022-08-16 PROCEDURE — 63600175 PHARM REV CODE 636 W HCPCS: Performed by: NURSE ANESTHETIST, CERTIFIED REGISTERED

## 2022-08-16 PROCEDURE — U0002 COVID-19 LAB TEST NON-CDC: HCPCS | Performed by: PODIATRIST

## 2022-08-16 PROCEDURE — 27000673 HC TUBING BLOOD Y: Performed by: ANESTHESIOLOGY

## 2022-08-16 PROCEDURE — 27000284 HC CANNULA NASAL: Performed by: ANESTHESIOLOGY

## 2022-08-16 PROCEDURE — 82962 GLUCOSE BLOOD TEST: CPT | Performed by: PODIATRIST

## 2022-08-16 PROCEDURE — C9290 INJ, BUPIVACAINE LIPOSOME: HCPCS | Performed by: PODIATRIST

## 2022-08-16 PROCEDURE — 27202103: Performed by: ANESTHESIOLOGY

## 2022-08-16 PROCEDURE — 27000080 OPTIME MED/SURG SUP & DEVICES GENERAL CLASSIFICATION: Performed by: PODIATRIST

## 2022-08-16 PROCEDURE — 25000003 PHARM REV CODE 250: Performed by: PODIATRIST

## 2022-08-16 PROCEDURE — 37000009 HC ANESTHESIA EA ADD 15 MINS: Performed by: PODIATRIST

## 2022-08-16 PROCEDURE — 36000706: Performed by: PODIATRIST

## 2022-08-16 PROCEDURE — 27000671 HC TUBING MICROBORE EXT: Performed by: ANESTHESIOLOGY

## 2022-08-16 PROCEDURE — 71000016 HC POSTOP RECOV ADDL HR: Performed by: PODIATRIST

## 2022-08-16 RX ORDER — ONDANSETRON 4 MG/1
4 TABLET, ORALLY DISINTEGRATING ORAL ONCE
Status: DISCONTINUED | OUTPATIENT
Start: 2022-08-16 | End: 2022-08-16 | Stop reason: HOSPADM

## 2022-08-16 RX ORDER — FAMOTIDINE 10 MG/ML
INJECTION INTRAVENOUS
Status: DISCONTINUED | OUTPATIENT
Start: 2022-08-16 | End: 2022-08-16

## 2022-08-16 RX ORDER — DIPHENHYDRAMINE HYDROCHLORIDE 50 MG/ML
12.5 INJECTION INTRAMUSCULAR; INTRAVENOUS
Status: DISCONTINUED | OUTPATIENT
Start: 2022-08-16 | End: 2022-08-16 | Stop reason: HOSPADM

## 2022-08-16 RX ORDER — PROMETHAZINE HYDROCHLORIDE 25 MG/1
25 TABLET ORAL EVERY 6 HOURS PRN
Status: CANCELLED | OUTPATIENT
Start: 2022-08-16

## 2022-08-16 RX ORDER — ONDANSETRON 4 MG/1
8 TABLET, ORALLY DISINTEGRATING ORAL EVERY 8 HOURS PRN
Status: CANCELLED | OUTPATIENT
Start: 2022-08-16

## 2022-08-16 RX ORDER — DIPHENHYDRAMINE HYDROCHLORIDE 50 MG/ML
INJECTION INTRAMUSCULAR; INTRAVENOUS
Status: DISCONTINUED | OUTPATIENT
Start: 2022-08-16 | End: 2022-08-16

## 2022-08-16 RX ORDER — LIDOCAINE HYDROCHLORIDE 20 MG/ML
INJECTION, SOLUTION EPIDURAL; INFILTRATION; INTRACAUDAL; PERINEURAL
Status: DISCONTINUED | OUTPATIENT
Start: 2022-08-16 | End: 2022-08-16

## 2022-08-16 RX ORDER — ONDANSETRON 4 MG/1
8 TABLET, ORALLY DISINTEGRATING ORAL EVERY 8 HOURS PRN
Qty: 30 TABLET | Refills: 0 | Status: SHIPPED | OUTPATIENT
Start: 2022-08-16 | End: 2022-08-19

## 2022-08-16 RX ORDER — PROPOFOL 10 MG/ML
VIAL (ML) INTRAVENOUS
Status: DISCONTINUED | OUTPATIENT
Start: 2022-08-16 | End: 2022-08-16

## 2022-08-16 RX ORDER — HYDROCODONE BITARTRATE AND ACETAMINOPHEN 5; 325 MG/1; MG/1
1 TABLET ORAL EVERY 4 HOURS PRN
Status: CANCELLED | OUTPATIENT
Start: 2022-08-16

## 2022-08-16 RX ORDER — ACETAMINOPHEN 10 MG/ML
INJECTION, SOLUTION INTRAVENOUS
Status: DISCONTINUED | OUTPATIENT
Start: 2022-08-16 | End: 2022-08-16

## 2022-08-16 RX ORDER — CEPHALEXIN 500 MG/1
500 CAPSULE ORAL EVERY 12 HOURS
Qty: 14 CAPSULE | Refills: 0 | Status: SHIPPED | OUTPATIENT
Start: 2022-08-16 | End: 2022-08-23

## 2022-08-16 RX ORDER — BUPIVACAINE HYDROCHLORIDE 5 MG/ML
INJECTION, SOLUTION EPIDURAL; INTRACAUDAL
Status: DISCONTINUED | OUTPATIENT
Start: 2022-08-16 | End: 2022-08-16 | Stop reason: HOSPADM

## 2022-08-16 RX ORDER — ONDANSETRON 2 MG/ML
4 INJECTION INTRAMUSCULAR; INTRAVENOUS DAILY PRN
Status: DISCONTINUED | OUTPATIENT
Start: 2022-08-16 | End: 2022-08-16 | Stop reason: HOSPADM

## 2022-08-16 RX ORDER — FENTANYL CITRATE 50 UG/ML
INJECTION, SOLUTION INTRAMUSCULAR; INTRAVENOUS
Status: DISCONTINUED | OUTPATIENT
Start: 2022-08-16 | End: 2022-08-16

## 2022-08-16 RX ORDER — TRAMADOL HYDROCHLORIDE 50 MG/1
50 TABLET ORAL EVERY 6 HOURS PRN
Qty: 28 TABLET | Refills: 0 | Status: SHIPPED | OUTPATIENT
Start: 2022-08-16 | End: 2022-08-19

## 2022-08-16 RX ORDER — ONDANSETRON 2 MG/ML
INJECTION INTRAMUSCULAR; INTRAVENOUS
Status: DISCONTINUED | OUTPATIENT
Start: 2022-08-16 | End: 2022-08-16

## 2022-08-16 RX ORDER — SODIUM CHLORIDE, SODIUM LACTATE, POTASSIUM CHLORIDE, CALCIUM CHLORIDE 600; 310; 30; 20 MG/100ML; MG/100ML; MG/100ML; MG/100ML
INJECTION, SOLUTION INTRAVENOUS CONTINUOUS PRN
Status: DISCONTINUED | OUTPATIENT
Start: 2022-08-16 | End: 2022-08-16

## 2022-08-16 RX ORDER — EPHEDRINE SULFATE 50 MG/ML
INJECTION, SOLUTION INTRAVENOUS
Status: DISCONTINUED | OUTPATIENT
Start: 2022-08-16 | End: 2022-08-16

## 2022-08-16 RX ORDER — FENTANYL CITRATE 50 UG/ML
25 INJECTION, SOLUTION INTRAMUSCULAR; INTRAVENOUS EVERY 5 MIN PRN
Status: DISCONTINUED | OUTPATIENT
Start: 2022-08-16 | End: 2022-08-16 | Stop reason: HOSPADM

## 2022-08-16 RX ADMIN — FENTANYL CITRATE 50 MCG: 50 INJECTION INTRAMUSCULAR; INTRAVENOUS at 11:08

## 2022-08-16 RX ADMIN — PROPOFOL 20 MG: 10 INJECTION, EMULSION INTRAVENOUS at 11:08

## 2022-08-16 RX ADMIN — LIDOCAINE HYDROCHLORIDE 80 MG: 20 INJECTION, SOLUTION INTRAVENOUS at 11:08

## 2022-08-16 RX ADMIN — DEXTROSE 2 G: 50 INJECTION, SOLUTION INTRAVENOUS at 10:08

## 2022-08-16 RX ADMIN — ONDANSETRON 4 MG: 2 INJECTION INTRAMUSCULAR; INTRAVENOUS at 10:08

## 2022-08-16 RX ADMIN — FAMOTIDINE 20 MG: 10 INJECTION, SOLUTION INTRAVENOUS at 10:08

## 2022-08-16 RX ADMIN — ACETAMINOPHEN 1000 MG: 10 INJECTION, SOLUTION INTRAVENOUS at 11:08

## 2022-08-16 RX ADMIN — DIPHENHYDRAMINE HYDROCHLORIDE 12.5 MG: 50 INJECTION, SOLUTION INTRAMUSCULAR; INTRAVENOUS at 10:08

## 2022-08-16 RX ADMIN — EPHEDRINE SULFATE 5 MG: 50 INJECTION INTRAVENOUS at 11:08

## 2022-08-16 RX ADMIN — FENTANYL CITRATE 25 MCG: 50 INJECTION INTRAMUSCULAR; INTRAVENOUS at 10:08

## 2022-08-16 RX ADMIN — SODIUM CHLORIDE, SODIUM LACTATE, POTASSIUM CHLORIDE, AND CALCIUM CHLORIDE: .6; .31; .03; .02 INJECTION, SOLUTION INTRAVENOUS at 10:08

## 2022-08-16 RX ADMIN — PROPOFOL 70 MG: 10 INJECTION, EMULSION INTRAVENOUS at 11:08

## 2022-08-16 NOTE — OP NOTE
Operative Report     Patient name: Daina Britt   MRN: [id number]  Date of surgery: 8/16/2022    Surgeon: Santos Kumari DPM   Assistant:  None    Preoperative diagnosis:  Soft tissue mass left heel  Postoperative diagnosis:  Same as above  Procedure:  Excision of soft tissue mass left heel  Anesthesia:  Mac with local  Hemostasis:  Pneumatic ankle tourniquet at 250 mmHg  Estimated blood loss:  1 mL   Specimen:  Soft tissue mass left heel  Complications: None  Condition upon discharge: Stable    Procedure in detail:  Patient was brought the operating room and remained on her operative stretcher.  Following adequate IV sedation well-padded pneumatic ankle tourniquet was placed around the patient's left ankle and set at 250 mmHg.  A local block consisting of 10 cc of 0.5% Marcaine plain was utilized a block of the surgical site at the plantar lateral left heel.  The left foot was then prepped scrubbed and draped in the normal aseptic manner.  A time-out was then called.  An Esmarch bandage was utilized to exsanguinate the left foot and the left pneumatic ankle tourniquet was inflated to 250 mmHg.  At this time attention was directed to the plantar lateral aspect of the patient's left heel where utilizing 15. Blade a small linear longitudinal incision was made overlying the site of soft tissue mass.  Dissection was then carried bluntly through the subcutaneous tissues ensuring to retract all vital neurovascular structures.  Bleeders cauterized as necessary.  Upon dissection through the subcutaneous tissues into the fascial layer a white firm well-circumscribed soft tissue mass was encountered.  Blunt dissection was carried around the mass and it was excised in total.  Noted to measure approximately 1 cm in diameter.  This was then passed from the operating field and sent to pathology for examination.  The area was inspected and no remaining abnormal tissue was seen.  The wound was copiously irrigated with sterile  saline.  Subcutaneous tissues were closed utilizing 3-0 Vicryl.  The skin was closed utilizing 4-0 Prolene in a simple interrupted fashion.  Postoperative block consisting of 8 cc of a one-to-one mixture of 0.5% Marcaine plain and Exparel was utilized a block the surgical site.  The foot was then dressed with Xeroform 4x4s Kerlix and an Ace wrap.  Pneumatic ankle tourniquet was deflated neurovascular status noted be intact to all digits of the left foot.  Patient's left foot was placed in a short-leg Cam Walker boot.  Patient tolerated the procedure well.  She had anesthesia reversed and left the operating Room stable vitals.    1. Keep dressings, clean, dry, and intact to surgical extremity.  2. Rest, ice, and elevate the surgical extremity.  3. Weight-bearing as tolerated to surgical extremity in Cam Walker boot  4. Take all medication as discussed at discharge.  5. Contact the clinic with any postoperative concerns or complications.  6. Follow up in one week for 1st post op.

## 2022-08-16 NOTE — TRANSFER OF CARE
"Anesthesia Transfer of Care Note    Patient: Daina Britt    Procedure(s) Performed: Procedure(s) (LRB):  EXCISION, MASS HEEL (Left)    Patient location: OPS    Anesthesia Type: MAC    Transport from OR: Transported from OR on room air with adequate spontaneous ventilation    Post pain: adequate analgesia    Post assessment: no apparent anesthetic complications and tolerated procedure well    Post vital signs: stable    Level of consciousness: awake, alert and oriented    Nausea/Vomiting: no nausea/vomiting    Complications: none    Transfer of care protocol was followedComments: Pt returning to SDS. Pt AAO in NAD. VSS. Report per OR RN.      Last vitals:   Visit Vitals  BP (!) 151/65 (BP Location: Left arm, Patient Position: Lying)   Pulse 64   Temp 36.7 °C (98.1 °F) (Temporal)   Resp 16   Ht 5' 5.5" (1.664 m)   Wt 79.3 kg (174 lb 13.2 oz)   SpO2 99%   Breastfeeding No   BMI 28.65 kg/m²     "

## 2022-08-16 NOTE — ANESTHESIA POSTPROCEDURE EVALUATION
Anesthesia Post Evaluation    Patient: Daina Britt    Procedure(s) Performed: Procedure(s) (LRB):  EXCISION, MASS HEEL (Left)    Final Anesthesia Type: MAC      Patient location during evaluation: PACU  Patient participation: Yes- Able to Participate  Level of consciousness: awake and alert  Post-procedure vital signs: reviewed and stable  Pain management: adequate  Airway patency: patent    PONV status at discharge: No PONV  Anesthetic complications: no      Cardiovascular status: hemodynamically stable  Respiratory status: unassisted, spontaneous ventilation and room air  Hydration status: euvolemic  Follow-up not needed.            No case tracking events are documented in the log.      Pain/Albert Score: No data recorded

## 2022-08-16 NOTE — ANESTHESIA PREPROCEDURE EVALUATION
08/16/2022  Daina Britt is a 61 y.o., female.      There is no problem list on file for this patient.      Past Surgical History:   Procedure Laterality Date    ANKLE FUSION Left 1997, 1999, 2011    ANKLE SURGERY Left 1998    screw removal    CERVICAL FUSION  1985    CHOLECYSTECTOMY  2013    LA CALCANEAL OSTEOTOMY  1992    HAND SURGERY Left 1978    LEG SURGERY Bilateral 1976    LEG SURGERY Bilateral 1977    staple removal    LUMBAR SYMPATHETIC NERVE BLOCK  1997, 1998, 1999, 2000, 2021    multiple    REPAIR OF MENISCUS OF KNEE Right 1998    SURGICAL REMOVAL OF BONE SPUR Left 1992    ankle    TENDON REPAIR Right 2019    ankle        Tobacco Use:  The patient  reports that she has never smoked. She has never used smokeless tobacco.     Results for orders placed or performed during the hospital encounter of 08/12/22   EKG 12-lead    Collection Time: 08/12/22  8:52 AM    Narrative    Test Reason : M79.89,M79.672,    Vent. Rate : 056 BPM     Atrial Rate : 056 BPM     P-R Int : 156 ms          QRS Dur : 100 ms      QT Int : 408 ms       P-R-T Axes : 006 027 080 degrees     QTc Int : 393 ms    Sinus bradycardia  Otherwise normal ECG  No previous ECGs available  Confirmed by Tomy Foote MD ([id number]) on 8/13/2022 1:25:24 PM    Referred By:             Confirmed By:Tomy Foote MD             Lab Results   Component Value Date    WBC 5.45 08/12/2022    HGB 13.0 08/12/2022    HCT 40.4 08/12/2022    MCV 89 08/12/2022     08/12/2022     BMP  Lab Results   Component Value Date     08/12/2022    K 4.4 08/12/2022     08/12/2022    CO2 28 08/12/2022    BUN 15 08/12/2022    CREATININE 0.6 08/12/2022    CALCIUM 9.9 08/12/2022    ANIONGAP 7 (L) 08/12/2022     (H) 08/12/2022     (H) 01/20/2022       No results found for this or any previous visit.        Pre-op  Assessment    I have reviewed the Patient Summary Reports.     I have reviewed the Nursing Notes. I have reviewed the NPO Status.   I have reviewed the Medications.     Review of Systems  Anesthesia Hx:  No problems with previous Anesthesia  History of prior surgery of interest to airway management or planning: cervical fusion. Denies Family Hx of Anesthesia complications.   Denies Personal Hx of Anesthesia complications.   Social:  No Alcohol Use, Non-Smoker    Hematology/Oncology:  Hematology Normal       -- Cancer in past history:  surgery  Oncology Comments: Colon cancer     EENT/Dental:  EENT/Dental Normal Eyes: Visual Impairment Has Bilateral Catarract    Cardiovascular:   Hypertension, poorly controlled ECG has been reviewed. Patient without cardiologist at this time    Pulmonary:  Pulmonary Normal    Renal/:  Renal/ Normal     Hepatic/GI:   GERD, well controlled Liver Disease, Hepatitis, A    Musculoskeletal:   Arthritis  Osteoarthritis  Patient post lumbar sympathetic nerve block Spine Disorders: lumbar and cervical Chronic Pain    Neurological:   Neuromuscular Disease, Guillain Barré syndrome  RSD (reflex sympathetic dystrophy)  Left lower extremity radicular pain  Chronic Pain Syndrome  Peripheral Neuropathy    Endocrine:   Diabetes, well controlled, type 2    Dermatological:  Skin Normal    Psych:  Psychiatric Normal           Physical Exam  General: Well nourished, Cooperative, Alert and Oriented    Airway:  Mallampati: III / II  Mouth Opening: Normal  TM Distance: Normal  Tongue: Normal  Neck ROM: Normal ROM    Dental:  Intact, Caps / Implants    Chest/Lungs:  Clear to auscultation, Normal Respiratory Rate    Heart:  Rate: Normal  Rhythm: Regular Rhythm  Sounds: Normal        Anesthesia Plan  Type of Anesthesia, risks & benefits discussed:    Anesthesia Type: MAC  Intra-op Monitoring Plan: Standard ASA Monitors  Post Op Pain Control Plan: multimodal analgesia and IV/PO Opioids PRN  Airway Plan:  Direct and Video, Post-Induction  ASA Score: 3  Anesthesia Plan Notes:   MAC  Benadryl 6.25 mg iv, Zofran 4 mg iv,  Ofirmev 1000 mg iv     Ready For Surgery From Anesthesia Perspective.     .

## 2022-08-19 ENCOUNTER — OFFICE VISIT (OUTPATIENT)
Dept: OBSTETRICS AND GYNECOLOGY | Facility: CLINIC | Age: 62
End: 2022-08-19
Payer: COMMERCIAL

## 2022-08-19 ENCOUNTER — TELEPHONE (OUTPATIENT)
Dept: OTOLARYNGOLOGY | Facility: CLINIC | Age: 62
End: 2022-08-19
Payer: COMMERCIAL

## 2022-08-19 ENCOUNTER — TELEPHONE (OUTPATIENT)
Dept: GASTROENTEROLOGY | Facility: CLINIC | Age: 62
End: 2022-08-19
Payer: COMMERCIAL

## 2022-08-19 ENCOUNTER — PATIENT MESSAGE (OUTPATIENT)
Dept: ADMINISTRATIVE | Facility: HOSPITAL | Age: 62
End: 2022-08-19
Payer: COMMERCIAL

## 2022-08-19 VITALS
WEIGHT: 175.69 LBS | BODY MASS INDEX: 28.23 KG/M2 | DIASTOLIC BLOOD PRESSURE: 78 MMHG | SYSTOLIC BLOOD PRESSURE: 138 MMHG | HEIGHT: 66 IN

## 2022-08-19 DIAGNOSIS — Z12.11 SCREENING FOR COLON CANCER: ICD-10-CM

## 2022-08-19 DIAGNOSIS — Z85.038 PERSONAL HISTORY OF COLON CANCER, STAGE I: ICD-10-CM

## 2022-08-19 DIAGNOSIS — Z00.00 WELLNESS EXAMINATION: Primary | ICD-10-CM

## 2022-08-19 DIAGNOSIS — Z12.31 SCREENING MAMMOGRAM FOR BREAST CANCER: ICD-10-CM

## 2022-08-19 DIAGNOSIS — K21.9 GASTROESOPHAGEAL REFLUX DISEASE, UNSPECIFIED WHETHER ESOPHAGITIS PRESENT: ICD-10-CM

## 2022-08-19 DIAGNOSIS — Z12.4 SCREENING FOR CERVICAL CANCER: ICD-10-CM

## 2022-08-19 PROCEDURE — 87624 HPV HI-RISK TYP POOLED RSLT: CPT | Performed by: OBSTETRICS & GYNECOLOGY

## 2022-08-19 PROCEDURE — 99386 PREV VISIT NEW AGE 40-64: CPT | Mod: S$GLB,,, | Performed by: OBSTETRICS & GYNECOLOGY

## 2022-08-19 PROCEDURE — 99999 PR PBB SHADOW E&M-EST. PATIENT-LVL IV: ICD-10-PCS | Mod: PBBFAC,,, | Performed by: OBSTETRICS & GYNECOLOGY

## 2022-08-19 PROCEDURE — 88175 CYTOPATH C/V AUTO FLUID REDO: CPT | Performed by: OBSTETRICS & GYNECOLOGY

## 2022-08-19 PROCEDURE — 99999 PR PBB SHADOW E&M-EST. PATIENT-LVL IV: CPT | Mod: PBBFAC,,, | Performed by: OBSTETRICS & GYNECOLOGY

## 2022-08-19 PROCEDURE — 99386 PR PREVENTIVE VISIT,NEW,40-64: ICD-10-PCS | Mod: S$GLB,,, | Performed by: OBSTETRICS & GYNECOLOGY

## 2022-08-19 NOTE — PROGRESS NOTES
Chief Complaint   Patient presents with    Well Woman     Due for Pap smear       History and Physical:  No LMP recorded. Patient is postmenopausal.    Contraception: Postmenopausal.  HRT: None.     Daina Britt is a 62 y.o.  who presents today for her routine annual GYN exam.   The patient has no Gynecology complaints today.     Last pap smear was 3 years ago. Denies any history of abnormal pap smears.   Mammogram: ordered by PCP.   Breast cancer: paternal aunt.  Colon cancer: personal history of colon cancer. Malignant polyp removed on colonoscopy. She did not require radiation or chemotherapy. Last colonoscopy was 3-4 years ago. She has not established with a GI provider since moving to [place].       Allergies:   Review of patient's allergies indicates:   Allergen Reactions    Iodinated contrast media Other (See Comments)     Myelogram dye-causes seizures    Titanium analogues Swelling     Titanium screws     Cortisone Other (See Comments)     Cortisone injection  (RSDS)(CRPS)    Codeine Nausea And Vomiting and Rash    Darvon [propoxyphene] Nausea And Vomiting and Rash    Demerol [meperidine] Nausea And Vomiting and Rash    Opioids - morphine analogues Nausea And Vomiting and Rash    Paragoric Nausea And Vomiting and Rash    Wygesic Nausea And Vomiting and Rash       Past Medical History:   Diagnosis Date    Cellulitis of left ankle 1998    Chronic pain 1990    Chronic pain     Colon cancer 2016    Concussion 1975    Diabetes 2010    Foot infection 1974    right    GERD (gastroesophageal reflux disease)     Guillain Barré syndrome     Hepatitis A     Osteoarthritis     Reflex sympathetic dystrophy     RSD (reflex sympathetic dystrophy)        Past Surgical History:   Procedure Laterality Date    ANKLE FUSION Left 2011    x 3. Previously done in  and .    ANKLE SURGERY Left     screw removal due to allergy to titanium.    ANKLE SURGERY Left  1992    La Procedure on left ankle.    CERVICAL FUSION  1985    CHOLECYSTECTOMY      Due to Gallstones.    DILATION AND CURETTAGE OF UTERUS      Due to miscarriage.    LA CALCANEAL OSTEOTOMY  1992    HAND SURGERY Left 1978    LEG SURGERY Bilateral 1976    LEG SURGERY Bilateral 1977    staple removal    LUMBAR SYMPATHETIC NERVE BLOCK  , , , ,     multiple    REPAIR OF MENISCUS OF KNEE Right 1998    SURGICAL REMOVAL OF BONE SPUR Left 1992    Ankle.    TENDON REPAIR Right 2019    ankle       MEDS:   Current Outpatient Medications:     aspirin (ECOTRIN) 81 MG EC tablet, Take 81 mg by mouth once daily. Take one daily, Disp: , Rfl:     atorvastatin (LIPITOR) 40 MG tablet, Take 40 mg by mouth every evening., Disp: , Rfl:     carboxymethylcellulose (REFRESH PLUS) 0.5 % Dpet, 1 drop 3 (three) times daily as needed., Disp: , Rfl:     cephALEXin (KEFLEX) 500 MG capsule, Take 1 capsule (500 mg total) by mouth every 12 (twelve) hours. for 7 days, Disp: 14 capsule, Rfl: 0    ibuprofen-acetaminophen (ADVIL DUAL ACTION) 125-250 mg Tab, Take by mouth every 8 (eight) hours. Take 2 twice a day, Disp: , Rfl:     losartan (COZAAR) 50 MG tablet, Take 1 tablet (50 mg total) by mouth once daily., Disp: 90 tablet, Rfl: 3    metFORMIN (GLUCOPHAGE) 500 MG tablet, Take 1 tablet (500 mg total) by mouth 2 (two) times daily., Disp: 180 tablet, Rfl: 3    omeprazole (PRILOSEC) 20 MG capsule, Take 1 capsule (20 mg total) by mouth 2 (two) times a day., Disp: 180 capsule, Rfl: 3    ondansetron (ZOFRAN-ODT) 4 MG TbDL, Take 2 tablets (8 mg total) by mouth every 8 (eight) hours as needed (nausea). (Patient not taking: Reported on 2022), Disp: 30 tablet, Rfl: 0    traMADoL (ULTRAM) 50 mg tablet, Take 1 tablet (50 mg total) by mouth every 6 (six) hours as needed for Pain., Disp: 28 tablet, Rfl: 0     OB History        1    Para   0    Term   0       0    AB   1    Living   0        "SAB   1    IAB        Ectopic        Multiple        Live Births               Obstetric Comments   SAB x 1.              Social History     Socioeconomic History    Marital status: Single   Occupational History    Occupation:      Employer: [employer]   Tobacco Use    Smoking status: Never Smoker    Smokeless tobacco: Never Used   Substance and Sexual Activity    Alcohol use: Never    Drug use: Never    Sexual activity: Not Currently       Family History   Problem Relation Age of Onset    Cancer Paternal Grandmother         ovarian cancer    Breast cancer Paternal Aunt          Past medical and surgical history reviewed.   I have reviewed the patient's medical history in detail and updated the computerized patient record.      Review of System:   Review of Systems   Constitutional: Negative for chills, fever and unexpected weight change.   HENT: Negative for congestion, ear pain, sinus pain and sore throat.    Eyes: Negative.    Respiratory: Negative for cough and shortness of breath.    Cardiovascular: Negative for chest pain.   Gastrointestinal: Negative for abdominal pain, constipation, diarrhea, nausea and vomiting.   Endocrine: Negative.    Genitourinary: Negative for dysuria, frequency, hematuria and urgency.        Gyn as per HPI   Musculoskeletal: Negative for arthralgias.   Skin: Negative for rash.   Neurological: Negative for syncope, weakness and headaches.   Psychiatric/Behavioral: The patient is not nervous/anxious.         Physical Exam:   /78 (BP Location: Left arm, Patient Position: Sitting, BP Method: Medium (Manual))   Ht 5' 5.5" (1.664 m)   Wt 79.7 kg (175 lb 11.3 oz)   BMI 28.79 kg/m²       Physical Exam:   Constitutional: She appears well-developed and well-nourished.    HENT:   Head: Normocephalic.     Neck: No thyroid mass present.    Cardiovascular: Normal rate, regular rhythm and normal heart sounds.     Pulmonary/Chest: Effort normal and breath sounds normal. Right " breast exhibits no mass, no nipple discharge and no skin change. Left breast exhibits no mass, no nipple discharge and no skin change.        Abdominal: Soft. There is no abdominal tenderness.     Genitourinary:    Inguinal canal, uterus, right adnexa and left adnexa normal.      Pelvic exam was performed with patient supine.   The external female genitalia was normal.   No external genitalia lesions identified,Cervix is normal. Right adnexum displays no mass and no tenderness. Left adnexum displays no mass and no tenderness. No tenderness or bleeding in the vagina. Vaginal atrophy noted. Uterus is not tender. Normal urethral meatus.Urethra findings: no tendernessBladder findings: no bladder tenderness          Musculoskeletal:      Right lower leg: No edema.      Left lower leg: No edema.      Lymphadenopathy:     She has no cervical adenopathy. No inguinal adenopathy noted on the right or left side.    Neurological: She is alert.   No gross defects noted    Skin: Skin is warm and dry.    Psychiatric: Mood normal.        Assessment:   The primary encounter diagnosis was Wellness examination. Diagnoses of Personal history of colon cancer, stage I, Screening mammogram for breast cancer, and Gastroesophageal reflux disease, unspecified whether esophagitis present were also pertinent to this visit.       Plan:   Wellness examination  -     Ambulatory referral/consult to Gynecology    Personal history of colon cancer, stage I    Screening mammogram for breast cancer    Gastroesophageal reflux disease, unspecified whether esophagitis present       No follow-ups on file.     The above was reviewed and discussed with the patient.    Annual exam and screening issues based on the patient's age, medical history and family history were reviewed / discussed.    At this time will proceed as follows:   Pap smear and HPV performed.   Mammogram ordered.   GI referral placed.     The patient's questions were answered, and she is in  agreement with the current plan.

## 2022-08-19 NOTE — TELEPHONE ENCOUNTER
Attempted to contact pt to reschedule appt due to provider being out sick. No answer; left voicemail for call back.

## 2022-08-22 ENCOUNTER — OFFICE VISIT (OUTPATIENT)
Dept: PODIATRY | Facility: CLINIC | Age: 62
End: 2022-08-22
Payer: COMMERCIAL

## 2022-08-22 VITALS
BODY MASS INDEX: 29.16 KG/M2 | RESPIRATION RATE: 16 BRPM | WEIGHT: 175 LBS | HEART RATE: 61 BPM | HEIGHT: 65 IN | OXYGEN SATURATION: 99 %

## 2022-08-22 DIAGNOSIS — M79.672 PAIN OF LEFT HEEL: ICD-10-CM

## 2022-08-22 DIAGNOSIS — M79.89 MASS OF SOFT TISSUE OF FOOT: Primary | ICD-10-CM

## 2022-08-22 DIAGNOSIS — M79.89 SOFT TISSUE MASS: ICD-10-CM

## 2022-08-22 PROCEDURE — 99024 PR POST-OP FOLLOW-UP VISIT: ICD-10-PCS | Mod: S$GLB,,, | Performed by: PODIATRIST

## 2022-08-22 PROCEDURE — 99024 POSTOP FOLLOW-UP VISIT: CPT | Mod: S$GLB,,, | Performed by: PODIATRIST

## 2022-08-22 NOTE — PROGRESS NOTES
"    [address] [facility] [address]  [place] [address]  Phone: [phone]   [phone]    Patient's PCP:Ervin Garnica MD  Referring Provider: No ref. provider found    Subjective:      Chief Complaint:: Post-op Evaluation (Excision of soft tissue mass left heel)    LIZZ Britt is a 62 y.o. female who presents today with a complaint of postop lasting for excision mass left foot done last week..    Systemic Doctor:   Date Last Seen:   Blood Sugar:   Hemoglobin A1c:     Vitals:    08/22/22 1436   Pulse: 61   Resp: 16   SpO2: 99%   Weight: 79.4 kg (175 lb)   Height: 5' 5" (1.651 m)   PainSc: 0-No pain      Shoe Size:     Past Surgical History:   Procedure Laterality Date    ANKLE FUSION Left 2011    x 3. Previously done in 1997 and 1999.    ANKLE SURGERY Left 1998    screw removal due to allergy to titanium.    ANKLE SURGERY Left 1992    [place] Procedure on left ankle.    CERVICAL FUSION  1985    CHOLECYSTECTOMY  2013    Due to Gallstones.    DILATION AND CURETTAGE OF UTERUS  1981    Due to miscarriage.    PAYNE CALCANEAL OSTEOTOMY  1992    HAND SURGERY Left 1978    LEG SURGERY Bilateral 1976    LEG SURGERY Bilateral 1977    staple removal    LUMBAR SYMPATHETIC NERVE BLOCK  1997, 1998, 1999, 2000, 2021    multiple    REPAIR OF MENISCUS OF KNEE Right 1998    SURGICAL REMOVAL OF BONE SPUR Left 1992    Ankle.    TENDON REPAIR Right 2019    ankle     Past Medical History:   Diagnosis Date    Cellulitis of left ankle 1998    Chronic pain 1990    Chronic pain     Colon cancer 2016    Concussion 1975    Diabetes 2010    Foot infection 1974    right    GERD (gastroesophageal reflux disease) 1992    Guillain Barré syndrome 1990    Hepatitis A 1995    Osteoarthritis 1990    Reflex sympathetic dystrophy 1997    RSD (reflex sympathetic dystrophy) 1992     Family History   Problem Relation Age of Onset    Cancer Paternal Grandmother         ovarian cancer    Breast cancer Paternal Aunt     "     Social History:   Marital Status: Single  Alcohol History:  reports no history of alcohol use.  Tobacco History:  reports that she has never smoked. She has never used smokeless tobacco.  Drug History:  reports no history of drug use.    Review of patient's allergies indicates:   Allergen Reactions    Iodinated contrast media Other (See Comments)     Myelogram dye-causes seizures    Titanium analogues Swelling     Titanium screws     Cortisone Other (See Comments)     Cortisone injection  (RSDS)(CRPS)    Codeine Nausea And Vomiting and Rash    Darvon [propoxyphene] Nausea And Vomiting and Rash    Demerol [meperidine] Nausea And Vomiting and Rash    Opioids - morphine analogues Nausea And Vomiting and Rash    Paragoric Nausea And Vomiting and Rash    Wygesic Nausea And Vomiting and Rash       Current Outpatient Medications   Medication Sig Dispense Refill    aspirin (ECOTRIN) 81 MG EC tablet Take 81 mg by mouth once daily. Take one daily      atorvastatin (LIPITOR) 40 MG tablet Take 40 mg by mouth every evening.      carboxymethylcellulose (REFRESH PLUS) 0.5 % Dpet 1 drop 3 (three) times daily as needed.      cephALEXin (KEFLEX) 500 MG capsule Take 1 capsule (500 mg total) by mouth every 12 (twelve) hours. for 7 days 14 capsule 0    ibuprofen-acetaminophen (ADVIL DUAL ACTION) 125-250 mg Tab Take by mouth every 8 (eight) hours. Take 2 twice a day      losartan (COZAAR) 50 MG tablet Take 1 tablet (50 mg total) by mouth once daily. 90 tablet 3    metFORMIN (GLUCOPHAGE) 500 MG tablet Take 1 tablet (500 mg total) by mouth 2 (two) times daily. 180 tablet 3    omeprazole (PRILOSEC) 20 MG capsule Take 1 capsule (20 mg total) by mouth 2 (two) times a day. 180 capsule 3     No current facility-administered medications for this visit.       Review of Systems      Objective:        Physical Exam:   Foot Exam    General  General Appearance: appears stated age and healthy   Orientation: alert and oriented to  person, place, and time   Affect: appropriate   Gait: antalgic   Assistance: (Cam Walker boot cast left foot)        Evaluate the patient she is healing normally with no signs of infection of excision of mass left heel.  Suture line intact.  Physical Exam  Ortho/SPM Exam     Imaging:            Assessment:       1. Mass of soft tissue of foot    2. Pain of left heel    3. Soft tissue mass      Plan:   Mass of soft tissue of foot    Pain of left heel    Soft tissue mass    Evaluated patient we redressed the area she will keep it dry and dressing intact and use Cam Walker boot cast.  She return to see Dr. Kumari in about 9-10 days for evaluation for suture removal of left heel incision.      Procedures          Counseling:     I provided patient education verbally regarding:   Patient diagnosis, treatment options, as well as alternatives, risks, and benefits.     This note was created using Dragon voice recognition software that occasionally misinterpreted phrases or words.

## 2022-08-22 NOTE — PROGRESS NOTES
"  [address] [facility] [address]  [place] [address]  Phone: [phone]   [phone]    Patient's PCP:Ervin Garnica MD  Referring Provider:No ref. provider found    Subjective:      Chief Complaint: Post-op Evaluation (Excision of soft tissue mass left heel)    Systemic Doctor: Dr. Garnica  Date Last Seen: 07/29/2022  Blood Sugar: has not checked today  Hemoglobin A1c: 6.2    Date of Surgery: 08/16/2022  Procedure: Excision of soft tissue mass left heel    HPI:   Daina Britt is a 62 y.o. female who returns to the clinic today for her post-operative visit. Daina Britt rates pain a 0/10 on a pain scale. Compliance of Care: weightbearing in cam walker boot cast, ace wrap and dressing intact.     Vitals:    08/22/22 1436   Pulse: 61   Resp: 16   SpO2: 99%   Weight: 79.4 kg (175 lb)   Height: 5' 5" (1.651 m)   PainSc: 0-No pain        Past Surgical History:   Procedure Laterality Date    ANKLE FUSION Left 2011    x 3. Previously done in 1997 and 1999.    ANKLE SURGERY Left 1998    screw removal due to allergy to titanium.    ANKLE SURGERY Left 1992    La Procedure on left ankle.    CERVICAL FUSION  1985    CHOLECYSTECTOMY  2013    Due to Gallstones.    DILATION AND CURETTAGE OF UTERUS  1981    Due to miscarriage.    LA CALCANEAL OSTEOTOMY  1992    HAND SURGERY Left 1978    LEG SURGERY Bilateral 1976    LEG SURGERY Bilateral 1977    staple removal    LUMBAR SYMPATHETIC NERVE BLOCK  1997, 1998, 1999, 2000, 2021    multiple    REPAIR OF MENISCUS OF KNEE Right 1998    SURGICAL REMOVAL OF BONE SPUR Left 1992    Ankle.    TENDON REPAIR Right 2019    ankle     Past Medical History:   Diagnosis Date    Cellulitis of left ankle 1998    Chronic pain 1990    Chronic pain     Colon cancer 2016    Concussion 1975    Diabetes 2010    Foot infection 1974    right    GERD (gastroesophageal reflux disease) 1992    Guillain Barré syndrome 1990    Hepatitis A 1995    Osteoarthritis 1990    " Reflex sympathetic dystrophy 1997    RSD (reflex sympathetic dystrophy) 1992     Family History   Problem Relation Age of Onset    Cancer Paternal Grandmother         ovarian cancer    Breast cancer Paternal Aunt         Social History:   Marital Status: Single  Alcohol History:  reports no history of alcohol use.  Tobacco History:  reports that she has never smoked. She has never used smokeless tobacco.  Drug History:  reports no history of drug use.    Review of patient's allergies indicates:   Allergen Reactions    Iodinated contrast media Other (See Comments)     Myelogram dye-causes seizures    Titanium analogues Swelling     Titanium screws     Cortisone Other (See Comments)     Cortisone injection  (RSDS)(CRPS)    Codeine Nausea And Vomiting and Rash    Darvon [propoxyphene] Nausea And Vomiting and Rash    Demerol [meperidine] Nausea And Vomiting and Rash    Opioids - morphine analogues Nausea And Vomiting and Rash    Paragoric Nausea And Vomiting and Rash    Wygesic Nausea And Vomiting and Rash       Current Outpatient Medications   Medication Sig Dispense Refill    aspirin (ECOTRIN) 81 MG EC tablet Take 81 mg by mouth once daily. Take one daily      atorvastatin (LIPITOR) 40 MG tablet Take 40 mg by mouth every evening.      carboxymethylcellulose (REFRESH PLUS) 0.5 % Dpet 1 drop 3 (three) times daily as needed.      cephALEXin (KEFLEX) 500 MG capsule Take 1 capsule (500 mg total) by mouth every 12 (twelve) hours. for 7 days 14 capsule 0    ibuprofen-acetaminophen (ADVIL DUAL ACTION) 125-250 mg Tab Take by mouth every 8 (eight) hours. Take 2 twice a day      losartan (COZAAR) 50 MG tablet Take 1 tablet (50 mg total) by mouth once daily. 90 tablet 3    metFORMIN (GLUCOPHAGE) 500 MG tablet Take 1 tablet (500 mg total) by mouth 2 (two) times daily. 180 tablet 3    omeprazole (PRILOSEC) 20 MG capsule Take 1 capsule (20 mg total) by mouth 2 (two) times a day. 180 capsule 3     No current  facility-administered medications for this visit.       Review of Systems      Objective:        Post-op surgery of the *** with normal healing, no signs of infection or dehiscence of wound. Hardware in place. Normal post op exam today. No redness, no drainage, no increase in local temperature, no significant swelling, sutures.steri-strips are intact.     Imaging:     Physical Exam:   Foot Exam  Physical Exam       Assessment:       1. Mass of soft tissue of foot    2. Pain of left heel    3. Soft tissue mass      Plan:   Mass of soft tissue of foot    Pain of left heel    Soft tissue mass      No follow-ups on file.    Procedures - None    The surgical dressing was removed showing no signs of infection, excess edema or malalignment. A new dry dressing was applied and patient was instructed to leave dressing intact until next visit or until instructed to remove.     This note was created using Dragon voice recognition software that occasionally misinterpreted phrases or words.

## 2022-08-23 LAB
HPV HR 12 DNA SPEC QL NAA+PROBE: NEGATIVE
HPV16 AG SPEC QL: NEGATIVE
HPV18 DNA SPEC QL NAA+PROBE: NEGATIVE

## 2022-08-24 ENCOUNTER — OFFICE VISIT (OUTPATIENT)
Dept: OTOLARYNGOLOGY | Facility: CLINIC | Age: 62
End: 2022-08-24
Payer: COMMERCIAL

## 2022-08-24 VITALS — TEMPERATURE: 98 F | WEIGHT: 176.13 LBS | BODY MASS INDEX: 29.34 KG/M2 | HEIGHT: 65 IN

## 2022-08-24 DIAGNOSIS — J38.3 VOCAL CORD BOWING: Primary | ICD-10-CM

## 2022-08-24 DIAGNOSIS — R09.A2 GLOBUS PHARYNGEUS: ICD-10-CM

## 2022-08-24 DIAGNOSIS — R49.0 DYSPHONIA: ICD-10-CM

## 2022-08-24 DIAGNOSIS — R13.10 DYSPHAGIA, UNSPECIFIED TYPE: ICD-10-CM

## 2022-08-24 DIAGNOSIS — K21.9 LPRD (LARYNGOPHARYNGEAL REFLUX DISEASE): ICD-10-CM

## 2022-08-24 LAB
FINAL PATHOLOGIC DIAGNOSIS: NORMAL
Lab: NORMAL

## 2022-08-24 PROCEDURE — 99999 PR PBB SHADOW E&M-EST. PATIENT-LVL V: ICD-10-PCS | Mod: PBBFAC,,, | Performed by: PHYSICIAN ASSISTANT

## 2022-08-24 PROCEDURE — 31575 LARYNGOSCOPY: ICD-10-PCS | Mod: S$GLB,,, | Performed by: PHYSICIAN ASSISTANT

## 2022-08-24 PROCEDURE — 99204 OFFICE O/P NEW MOD 45 MIN: CPT | Mod: 25,S$GLB,, | Performed by: PHYSICIAN ASSISTANT

## 2022-08-24 PROCEDURE — 99999 PR PBB SHADOW E&M-EST. PATIENT-LVL V: CPT | Mod: PBBFAC,,, | Performed by: PHYSICIAN ASSISTANT

## 2022-08-24 PROCEDURE — 99204 PR OFFICE/OUTPT VISIT, NEW, LEVL IV, 45-59 MIN: ICD-10-PCS | Mod: 25,S$GLB,, | Performed by: PHYSICIAN ASSISTANT

## 2022-08-24 PROCEDURE — 31575 DIAGNOSTIC LARYNGOSCOPY: CPT | Mod: S$GLB,,, | Performed by: PHYSICIAN ASSISTANT

## 2022-08-24 NOTE — PATIENT INSTRUCTIONS
Continue prilosec 20mg twice daily. Take gaviscon after dinner and prior to bedtime to help form barrier to help reduce acid reflux. Proceed with speech therapy. Follow up in 8 weeks for symptoms recheck.   ACID REFLUX   What is acid reflux?    When we eat, food passes from the throat and into the stomach through a tube called the esophagus. At the bottom of the esophagus is a ring of muscles that acts as a valve between the esophagus and stomach, called the lower esophageal sphincter. Smoking, alcohol, and certain types of food may weaken the sphincter, so it may stop closing properly. The contents in the stomach then may leak back, or reflux, into the esophagus. This problem is called gastroesophageal reflux disease (GERD). Symptoms of GERD include heartburn, belching, regurgitation of stomach contents, and swallowing difficulties.    Sometimes, the stomach acid travels up through the esophagus and spills into the larynx or pharynx (voice box). This is called laryngopharyngeal reflux (LPR) and is irritating to the vocal folds and surrounding tissues. Often, patients with LPR do not experience heartburn as a symptom. More commonly, symptoms of LPR include hoarseness, excessive mucous resulting in frequent throat clearing, post-nasal drip, coughing, throat soreness or burning, choking episodes, difficulty swallowing, and sensation of a lump in the throat.     How is acid reflux treated?   Treatment for acid reflux can involve any combination of medication, lifestyle modifications, and surgery.   Medications. Your doctor may prescribe a proton pump inhibitor (PPI) or an H2 blocker. If you are prescribed a PPI, take in on an empty stomach in the morning 30 minutes prior to eating breakfast. Keep in mind that it may take 4-6 weeks before symptoms begin to resolve, so do not stop medications without consulting your doctor.   Lifestyle and dietary modifications. Eat smaller meals at a slower pace. Avoid over-eating. If  you are overweight, try to lose weight. Do not lie down or exercise directly after eating; eat your last meal of the day at least 2-3 hours prior to going to sleep. Avoid tight-fitting clothes. If you are a smoker, reduce or quit smoking. Elevate your head of bed 4-6 inches by putting phone books under the legs at the head of your bed or buy a wedge pillow, but do not use more than two regular pillows as this causes the body to curl and compresses your stomach.     Foods to avoid to reduce reflux   Whole milk, 2% milk, chocolate milk/hot chocolate, alcohol, coffee (regular and decaf), caffeinated tea, mint tea, carbonated beverages, citrus juice    Commercial sweet rolls, doughnuts, croissants, and other high-fat pastries    Fried or cream-style vegetables, tomatoes, tomato-based products, citrus fruits, hot peppers    Cream, cheese, tomato-based soups, vinegar    Fatty or fried meat/fish, aranda, sausage, pepperoni, lunch meat, fried eggs    Lard, aranda drippings, salt pork, meat drippings, gravies, highly seasoned salad dressings, nuts    Anything made with or from chocolate, peppermint, spearmint, whole milk, or cream; high-fat pastries, gum, hard candy       THROAT CLEARING     Why am I clearing my throat so often?   Irritation of the vocal folds and surrounding area can cause the urge to clear your throat. This irritation can be caused by acid reflux, allergies, or environmental factors, as well as from a cold or sore throat. Talk with your doctor about the treatment of possible underlying causes. However, throat clearing can turn into a cycle. You clear your throat after feeling an irritation, which causes more irritation, which causes you to continue clearing your throat, which causes more irritation.     What can I do about it?   Ask a friend or family member to help you keep track - you may not even realize how often you do it.   Replace the throat clearing with a different behavior.   Take a sip of water  and then swallow. Repeat until the sensation subsides.  Swallow hard (even without water)   Use the silent throat clear method by making the h sound when you exhale  Breathe in deeply through your nose and exhale on shhh   Hum quietly   Keep yourself hydrated.   Drink plenty of water   Eat wet snacks (grapes, apples, melon, cucumber)   Use a humidifier at home or at work   Suck on hard candies, but avoid too much sugar and avoid anything with mint, menthol, camphor, or eucaplyptus, as these will have a more irritating effect.

## 2022-08-24 NOTE — PROCEDURES
Laryngoscopy    Date/Time: 8/24/2022 8:45 AM  Performed by: Dedrick Wade PA-C  Authorized by: Dedrick Wade PA-C     Consent Done?:  Yes (Verbal)  Anesthesia:     Local anesthetic:  Lidocaine 4% and Erwin-Synephrine 1/2%    Patient tolerance:  Patient tolerated the procedure well with no immediate complications    Decongestion performed?: Yes    Laryngoscopy:     Areas examined:  Nasopharynx, oropharynx, hypopharynx, larynx and vocal cords    Laryngoscope size: disposable flexible ambu scope.  Nose External:      No external nasal deformity  Nasopharynx:      No mucosa lesions     Eustachian tube patent  Larynx/hypopharynx:      No epiglottis lesions     No epiglottis edema     No AE folds lesions     No vocal cord polyps     No hypopharynx lesions     No piriform sinus pooling     No piriform sinus lesions     Post cricoid edema     Post cricoid erythema       Good abduction of vocal cords with breathing and adduction with phonation. No pareses or paralysis of vocal cords. Mild bilateral vocal cord atrophy with vocal cord bowing.

## 2022-08-24 NOTE — PROGRESS NOTES
[facility]    Subjective:      Patient: Daina Britt Patient PCP: Ervin Garnica MD         :  1960     Sex:  female      MRN:  [id number]          Date of Visit: 2022      Chief Complaint: Globus sensation    Patient ID: Daina Britt is a 62 y.o. female who was referred to me by Dr. Ervin Garnica in consultation for globus pharyngeus. Pt seen by family medicine 2022 with complaints of occasional globus sensation. Pt has h/o GERD and takes prilosec 20mg BID. Pt states that she has had chronic globus sensation for around 3.5 years. Pt states that she rarely has to force liquids down her throat. Pt states that this occurs with liquids, not solids. Pt denies throat pain on a regular basis, but states she always feels as though she has something stuck in her throat. Pt states she has had voice change with globus sensation and pt used to be a jones. Pt states that her voice has become raspier. Pt states that if she talks a lot, she will get a cough and have to get a drink to clear her throat. Pt denies smoking history. Pt states she does not have issues with acid reflux. Pt denies fever/chills, drenching night sweats, PND, or unintentional weight loss.     Current job:      Past Medical History  She has a past medical history of Cellulitis of left ankle, Chronic pain, Chronic pain, Colon cancer, Concussion, Diabetes, Foot infection, GERD (gastroesophageal reflux disease), Guillain Barré syndrome, Hepatitis A, Osteoarthritis, Reflex sympathetic dystrophy, and RSD (reflex sympathetic dystrophy).    Family History  Her family history includes Breast cancer in her paternal aunt; Cancer in her paternal grandmother.    Past Surgical History:   Procedure Laterality Date    ANKLE FUSION Left 2011    x 3. Previously done in  and .    ANKLE SURGERY Left     screw removal due to allergy to titanium.    ANKLE SURGERY Left     Payne Procedure on left ankle.    CERVICAL FUSION   1985    CHOLECYSTECTOMY  2013    Due to Gallstones.    DILATION AND CURETTAGE OF UTERUS  1981    Due to miscarriage.    LA CALCANEAL OSTEOTOMY  1992    HAND SURGERY Left 1978    LEG SURGERY Bilateral 1976    LEG SURGERY Bilateral 1977    staple removal    LUMBAR SYMPATHETIC NERVE BLOCK  1997, 1998, 1999, 2000, 2021    multiple    REPAIR OF MENISCUS OF KNEE Right 1998    SURGICAL REMOVAL OF BONE SPUR Left 1992    Ankle.    TENDON REPAIR Right 2019    ankle     Social History     Tobacco Use    Smoking status: Never Smoker    Smokeless tobacco: Never Used   Substance and Sexual Activity    Alcohol use: Never    Drug use: Never    Sexual activity: Not Currently     Medications  She has a current medication list which includes the following prescription(s): aspirin, atorvastatin, carboxymethylcellulose, advil dual action, losartan, metformin, and omeprazole.    Review of patient's allergies indicates:   Allergen Reactions    Iodinated contrast media Other (See Comments)     Myelogram dye-causes seizures    Titanium analogues Swelling     Titanium screws     Cortisone Other (See Comments)     Cortisone injection  (RSDS)(CRPS)    Codeine Nausea And Vomiting and Rash    Darvon [propoxyphene] Nausea And Vomiting and Rash    Demerol [meperidine] Nausea And Vomiting and Rash    Opioids - morphine analogues Nausea And Vomiting and Rash    Paragoric Nausea And Vomiting and Rash    Wygesic Nausea And Vomiting and Rash     All medications, allergies, and past history have been reviewed.    Objective:      Vitals:  Vitals - 1 value per visit 8/22/2022 8/24/2022 8/24/2022   SYSTOLIC - - -   DIASTOLIC - - -   Pulse 61 - -   Temp - - 98.1   Resp 16 - -   SPO2 99 - -   Weight (lb) 175 - 176.15   Weight (kg) 79.379 - 79.9   Height 65 - 65   BMI (Calculated) 29.1 - 29.3   VISIT REPORT - - -   Pain Score  - 2 -       Body surface area is 1.91 meters squared.  Physical Exam  Constitutional:       General: She is  not in acute distress.     Appearance: Normal appearance. She is not ill-appearing.   HENT:      Head: Normocephalic and atraumatic.      Right Ear: Tympanic membrane, ear canal and external ear normal.      Left Ear: Tympanic membrane, ear canal and external ear normal.      Nose: Septal deviation present.      Mouth/Throat:      Lips: Pink. No lesions.      Mouth: Mucous membranes are moist. No oral lesions.      Tongue: No lesions.      Palate: No lesions.      Pharynx: Oropharynx is clear. Uvula midline. No pharyngeal swelling, oropharyngeal exudate, posterior oropharyngeal erythema or uvula swelling.      Tonsils: No tonsillar exudate or tonsillar abscesses.      Comments: [place] tonsils 1-2+ bilaterally with tonsillar crypts.   Eyes:      General:         Right eye: No discharge.         Left eye: No discharge.      Extraocular Movements: Extraocular movements intact.      Conjunctiva/sclera: Conjunctivae normal.   Pulmonary:      Effort: Pulmonary effort is normal.   Lymphadenopathy:      Cervical: No cervical adenopathy.   Neurological:      General: No focal deficit present.      Mental Status: She is alert and oriented to person, place, and time. Mental status is at baseline.   Psychiatric:         Mood and Affect: Mood normal.         Behavior: Behavior normal.         Thought Content: Thought content normal.         Judgment: Judgment normal.     Laryngoscopy     Date/Time: 8/24/2022 8:45 AM  Performed by: Dedrick Wade PA-C  Authorized by: Dedrick Wade PA-C      Consent Done?:  Yes (Verbal)  Anesthesia:     Local anesthetic:  Lidocaine 4% and Erwin-Synephrine 1/2%    Patient tolerance:  Patient tolerated the procedure well with no immediate complications    Decongestion performed?: Yes    Laryngoscopy:     Areas examined:  Nasopharynx, oropharynx, hypopharynx, larynx and vocal cords    Laryngoscope size: disposable flexible ambu scope.  Nose External:      No external nasal  deformity  Nasopharynx:      No mucosa lesions     Eustachian tube patent  Larynx/hypopharynx:      No epiglottis lesions     No epiglottis edema     No AE folds lesions     No vocal cord polyps     No hypopharynx lesions     No piriform sinus pooling     No piriform sinus lesions     Post cricoid edema     Post cricoid erythema       Good abduction of vocal cords with breathing and adduction with phonation. No pareses or paralysis of vocal cords. Mild bilateral vocal cord atrophy with vocal cord bowing.     Labs:  WBC   Date Value Ref Range Status   08/12/2022 5.45 3.90 - 12.70 K/uL Final     Eosinophil %   Date Value Ref Range Status   08/12/2022 1.5 0.0 - 8.0 % Final     Eos #   Date Value Ref Range Status   08/12/2022 0.1 0.0 - 0.5 K/uL Final     Platelets   Date Value Ref Range Status   08/12/2022 188 150 - 450 K/uL Final     Glucose   Date Value Ref Range Status   08/12/2022 112 (H) 70 - 110 mg/dL Final     All lab results, imaging results, and data have been reviewed.    Assessment:        ICD-10-CM ICD-9-CM   1. Vocal cord bowing  J38.3 478.5   2. Globus pharyngeus  R19.8 306.4   3. Dysphagia, unspecified type  R13.10 787.20   4. Dysphonia  R49.0 784.42   5. 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 Pt is to proceed with speech therapy for voice.     Globus pharyngeus with LPRD  -     Laryngoscopy shows post-cricoid edema and erythema indicating LPRD. Pt is to follow acid reflux handout as provided in AVS and is to continue prilosec 20mg twice daily and is to take gaviscon after dinner and prior to bedtime to help form barrier to help reduce acid reflux. Pt is to follow throat clearing handout as provided in AVS. Follow up in 8 weeks for symptoms recheck.     Dysphagia, unspecified type  -     Pt states that she  occasionally has to force liquids down her throat. Pt states that this occurs with liquids, not solids, which is an interesting symptomology even with mild glottic insufficiency. No choking or symptoms of spasm or SOB or wheezing. Pt is to undergo MBSS to further assess. Will also further assess globus sensation to further assess if globus is multifactorial and more than just secondary to LPRD. Our office will call pt with result and recommendations of swallow study.

## 2022-08-30 ENCOUNTER — PATIENT OUTREACH (OUTPATIENT)
Dept: ADMINISTRATIVE | Facility: HOSPITAL | Age: 62
End: 2022-08-30
Payer: COMMERCIAL

## 2022-08-30 ENCOUNTER — HOSPITAL ENCOUNTER (OUTPATIENT)
Dept: RADIOLOGY | Facility: HOSPITAL | Age: 62
Discharge: HOME | End: 2022-08-30
Attending: FAMILY MEDICINE
Payer: COMMERCIAL

## 2022-08-30 DIAGNOSIS — Z00.00 WELLNESS EXAMINATION: ICD-10-CM

## 2022-08-30 PROCEDURE — 77063 BREAST TOMOSYNTHESIS BI: CPT | Mod: TC

## 2022-08-30 PROCEDURE — 77067 MAMMO DIGITAL SCREENING BILAT WITH TOMO: ICD-10-PCS | Mod: 26,,, | Performed by: RADIOLOGY

## 2022-08-30 PROCEDURE — 77063 BREAST TOMOSYNTHESIS BI: CPT | Mod: 26,,, | Performed by: RADIOLOGY

## 2022-08-30 PROCEDURE — 77063 MAMMO DIGITAL SCREENING BILAT WITH TOMO: ICD-10-PCS | Mod: 26,,, | Performed by: RADIOLOGY

## 2022-08-30 PROCEDURE — 77067 SCR MAMMO BI INCL CAD: CPT | Mod: 26,,, | Performed by: RADIOLOGY

## 2022-08-30 NOTE — PROGRESS NOTES
Routine Dilated Eye Exam  (Diabetic Retinopathy screening)     Non-compliant report chart audits for Eye Exam for Patients with Diabetes     The following record(s)  below were uploaded for Health Maintenance .      DM EYE EXAM

## 2022-09-01 ENCOUNTER — OFFICE VISIT (OUTPATIENT)
Dept: PODIATRY | Facility: CLINIC | Age: 62
End: 2022-09-01
Payer: COMMERCIAL

## 2022-09-01 VITALS — BODY MASS INDEX: 29.32 KG/M2 | HEIGHT: 65 IN | OXYGEN SATURATION: 95 % | HEART RATE: 62 BPM | WEIGHT: 176 LBS

## 2022-09-01 DIAGNOSIS — M79.89 SOFT TISSUE MASS: ICD-10-CM

## 2022-09-01 DIAGNOSIS — M79.89 MASS OF SOFT TISSUE OF FOOT: Primary | ICD-10-CM

## 2022-09-01 DIAGNOSIS — M79.672 PAIN OF LEFT HEEL: ICD-10-CM

## 2022-09-01 PROCEDURE — 99024 POSTOP FOLLOW-UP VISIT: CPT | Mod: S$GLB,,, | Performed by: PODIATRIST

## 2022-09-01 PROCEDURE — 99024 PR POST-OP FOLLOW-UP VISIT: ICD-10-PCS | Mod: S$GLB,,, | Performed by: PODIATRIST

## 2022-09-01 RX ORDER — ROSUVASTATIN CALCIUM 20 MG/1
20 TABLET, COATED ORAL DAILY
COMMUNITY
End: 2023-01-16 | Stop reason: SDUPTHER

## 2022-09-01 NOTE — PATIENT INSTRUCTIONS
Your Diabetes Foot Care Program    Every day you depend on your feet to keep you moving. But when you have diabetes, your feet need special care. Even a small foot problem can become very serious. So dont take your feet for granted. By working with your diabetes healthcare team, you can learn how to protect your feet and keep them healthy.  Evaluating your feet  An evaluation helps your healthcare provider check the condition of your feet. The evaluation includes a review of your diabetes history and overall health. It may also include a foot exam, X-rays, or other tests. These can help show problems beneath the skin that you cant see or feel.  Medical history  You will be asked about your overall health and any history of foot problems. Youll also discuss your diabetes history, such as whether your blood sugar level has changed over time. It also includes questions about sensations of pain, tingling, pins and needles, or numbness. Your healthcare provider will also want to know if you have high blood pressure and heart disease, or if you smoke. Be sure to mention any medicines (including over-the-counter), supplements, or herbal remedies you take.  Foot exam  A foot exam checks the condition of different parts of your foot. First, your skin and nails are examined for any signs of infection. Blood flow is checked by feeling for the pulses in each foot. You may also have tests to study the nerves in the foot. These include using a small filament (wire) to see how sensitive your feet are. In certain cases, you will be asked to walk a short distance to check for bone, joint, and muscle problems.  Diagnostic tests  If needed, your healthcare provider will suggest certain tests to learn more about your feet. These include:  Doppler tests to measure blood flow in the feet and lower leg.  X-rays, which can show bone or joint problems.  Other imaging tests, such as an MRI (magnetic resonance imaging), bone scan, and CT  (computed tomography) scan. These can help show bone infections.  Other tests, such as vascular tests, which study the blood flow in your feet and legs. You may also have nerve studies to learn how sensitive your feet are.  Creating a foot care program  Based on the evaluation, your healthcare provider will create a foot care program for you. Your program may be as simple as starting a daily self-care routine and changing the types of shoes your wear. It may also involve treating minor foot problems, such as a corn or blister. In some cases, surgery will be needed to treat an infection or mechanical problems, such as hammer toes.  Preventing problems  When you have diabetes, its easier to prevent problems than to treat them later on. So see your healthcare team for regular checkups and foot care. Your healthcare team can also help you learn more about caring for your feet at home. For example, you may be told to avoid walking barefoot. Or you may be told that special footwear is needed to protect your feet.  Have regular checkups  Foot problems can develop quickly. So be sure to follow your healthcare teams schedule for regular checkups. During office visits, take off your shoes and socks as soon as you get in the exam room. Ask your healthcare provider to examine your feet for problems. This will make it easier to find and treat small skin irritations before they get worse. Regular checkups can also help keep track of the blood flow and feeling in your feet. If you have neuropathy (lack of feeling in your feet), you will need to have checkups more often.  Learn about self-care  The more you know about diabetes and your feet, the easier it will be to prevent problems. Members of your healthcare team can teach you how to inspect your feet and teach you to look for warning signs. They can also give you other foot care tips. During office visits, be sure to ask any questions you have.  Date Last Reviewed: 7/1/2016  ©  8582-6639 [employer]. [address] [place] [address], [place] [address]. All rights reserved. This information is not intended as a substitute for professional medical care. Always follow your healthcare professional's instructions.

## 2022-09-01 NOTE — PROGRESS NOTES
"  [address] [facility] [address]  [place] [address]  Phone: [phone]   [phone]    Patient's PCP:Ervin Garnica MD  Referring Provider:No ref. provider found    Subjective:      Chief Complaint: Post-op Evaluation (Excision of soft tissue mass left heel)      Systemic Doctor: Ervin Garnica MD  Date Last Seen: 7/29/2022  Blood Sugar: doesn't take  Hemoglobin A1c: 6.2 (7/14/2022)    Date of Surgery: 8/16/2022  Procedure: Excision of soft tissue mass left heel    HPI:   Daina Britt is a 62 y.o. female who returns to the clinic today for her post-operative visit. Daina Britt rates pain a 0/10 on a pain scale. Compliance of Care:  Patient presents in bandage, ace wrap, and boot cast.  Intraoperative pathology shows angioleiomyoma.     Vitals:    09/01/22 1350   Pulse: 62   SpO2: 95%   Weight: 79.8 kg (176 lb)   Height: 5' 5" (1.651 m)   PainSc: 0-No pain        Past Surgical History:   Procedure Laterality Date    ANKLE FUSION Left 2011    x 3. Previously done in 1997 and 1999.    ANKLE SURGERY Left 1998    screw removal due to allergy to titanium.    ANKLE SURGERY Left 1992    Pay[place] Procedure on left ankle.    CERVICAL FUSION  1985    CHOLECYSTECTOMY  2013    Due to Gallstones.    DILATION AND CURETTAGE OF UTERUS  1981    Due to miscarriage.    PAYNE CALCANEAL OSTEOTOMY  1992    HAND SURGERY Left 1978    LEG SURGERY Bilateral 1976    LEG SURGERY Bilateral 1977    staple removal    LUMBAR SYMPATHETIC NERVE BLOCK  1997, 1998, 1999, 2000, 2021    multiple    REPAIR OF MENISCUS OF KNEE Right 1998    SURGICAL REMOVAL OF BONE SPUR Left 1992    Ankle.    TENDON REPAIR Right 2019    ankle     Past Medical History:   Diagnosis Date    Cellulitis of left ankle 1998    Chronic pain 1990    Chronic pain     Colon cancer 2016    Concussion 1975    Diabetes 2010    Foot infection 1974    right    GERD (gastroesophageal reflux disease) 1992    Guillain Barré syndrome 1990    Hepatitis A 1995    Osteoarthritis 1990 "    Reflex sympathetic dystrophy 1997    RSD (reflex sympathetic dystrophy) 1992     Family History   Problem Relation Age of Onset    Cancer Paternal Grandmother         ovarian cancer    Breast cancer Paternal Aunt         Social History:   Marital Status: Single  Alcohol History:  reports no history of alcohol use.  Tobacco History:  reports that she has never smoked. She has never used smokeless tobacco.  Drug History:  reports no history of drug use.    Review of patient's allergies indicates:   Allergen Reactions    Iodinated contrast media Other (See Comments)     Myelogram dye-causes seizures    Titanium analogues Swelling     Titanium screws     Cortisone Other (See Comments)     Cortisone injection  (RSDS)(CRPS)    Codeine Nausea And Vomiting and Rash    Darvon [propoxyphene] Nausea And Vomiting and Rash    Demerol [meperidine] Nausea And Vomiting and Rash    Opioids - morphine analogues Nausea And Vomiting and Rash    Paragoric Nausea And Vomiting and Rash    Wygesic Nausea And Vomiting and Rash       Current Outpatient Medications   Medication Sig Dispense Refill    aspirin (ECOTRIN) 81 MG EC tablet Take 81 mg by mouth once daily. Take one daily      carboxymethylcellulose (REFRESH PLUS) 0.5 % Dpet 1 drop 3 (three) times daily as needed.      ibuprofen-acetaminophen (ADVIL DUAL ACTION) 125-250 mg Tab Take by mouth every 8 (eight) hours. Take 2 twice a day      losartan (COZAAR) 50 MG tablet Take 1 tablet (50 mg total) by mouth once daily. 90 tablet 3    mag/aluminum/sod bicarb/alginc (GAVISCON ORAL) Take 0.5 fluid ounces by mouth once daily.      metFORMIN (GLUCOPHAGE) 500 MG tablet Take 1 tablet (500 mg total) by mouth 2 (two) times daily. 180 tablet 3    omeprazole (PRILOSEC) 20 MG capsule Take 1 capsule (20 mg total) by mouth 2 (two) times a day. 180 capsule 3    rosuvastatin (CRESTOR) 20 MG tablet Take 20 mg by mouth once daily.      atorvastatin (LIPITOR) 40 MG tablet Take 40 mg by mouth every  evening.       No current facility-administered medications for this visit.       Review of Systems   Constitutional:  Negative for chills, fatigue, fever and unexpected weight change.   HENT:  Negative for hearing loss and trouble swallowing.    Eyes:  Negative for photophobia and visual disturbance.   Respiratory:  Negative for cough, shortness of breath and wheezing.    Cardiovascular:  Negative for chest pain, palpitations and leg swelling.   Gastrointestinal:  Negative for abdominal pain and nausea.   Genitourinary:  Negative for dysuria and frequency.   Musculoskeletal:  Negative for arthralgias, back pain, gait problem, joint swelling and myalgias.   Skin:  Negative for rash and wound.   Neurological:  Negative for tremors, seizures, weakness, numbness and headaches.   Hematological:  Does not bruise/bleed easily.       Objective:        Post-op surgery of the left foot with normal healing, no signs of infection or dehiscence of wound. Normal post op exam today. No redness, no drainage, no increase in local temperature, no significant swelling, sutures.steri-strips are intact.     Imaging: none     Physical Exam:   Foot Exam  Physical Exam       Assessment:       1. Mass of soft tissue of foot    2. Pain of left heel    3. Soft tissue mass      Plan:   Mass of soft tissue of foot    Pain of left heel    Soft tissue mass    Follow up in about 4 weeks (around 9/29/2022), or if symptoms worsen or fail to improve.    Procedures - sutures removed from left foot incision.  Surgical incision well healed.    Patient is okay to begin showering.  She can transition to normal shoe gear as symptoms allow.  Recommend she keep a simple Band-Aid dressing over the area for the next several days.  She can otherwise increase activities as tolerated.  Ice as needed for pain and swelling.    This note was created using Dragon voice recognition software that occasionally misinterpreted phrases or words.

## 2022-09-10 LAB — HEMOCCULT STL QL IA: NEGATIVE

## 2022-09-14 DIAGNOSIS — R13.10 DYSPHAGIA: Primary | ICD-10-CM

## 2022-09-29 ENCOUNTER — OFFICE VISIT (OUTPATIENT)
Dept: PODIATRY | Facility: CLINIC | Age: 62
End: 2022-09-29
Payer: COMMERCIAL

## 2022-09-29 VITALS — BODY MASS INDEX: 29.29 KG/M2 | HEIGHT: 65 IN | OXYGEN SATURATION: 100 % | HEART RATE: 65 BPM

## 2022-09-29 DIAGNOSIS — M79.89 MASS OF SOFT TISSUE OF FOOT: Primary | ICD-10-CM

## 2022-09-29 DIAGNOSIS — M79.672 PAIN OF LEFT HEEL: ICD-10-CM

## 2022-09-29 PROCEDURE — 99024 POSTOP FOLLOW-UP VISIT: CPT | Mod: S$GLB,,, | Performed by: PODIATRIST

## 2022-09-29 PROCEDURE — 99024 PR POST-OP FOLLOW-UP VISIT: ICD-10-PCS | Mod: S$GLB,,, | Performed by: PODIATRIST

## 2022-09-29 NOTE — PROGRESS NOTES
"  [address] [facility] [address]  [place] [address]  Phone: [phone]   [phone]    Patient's PCP:Ervin Garnica MD  Referring Provider:No ref. provider found    Subjective:      Chief Complaint: Post-op Evaluation    Systemic Doctor: Ervin Garnica MD  Date Last Seen: 7/29/2022  Blood Sugar: doesn't take  Hemoglobin A1c: 6.2 (7/14/2022)     Date of Surgery: 8/16/2022  Procedure: Excision of soft tissue mass left heel    HPI:   Daina Britt is a 62 y.o. female who returns to the clinic today for her post-operative visit. Daina Britt rates pain a 0/10 on a pain scale.     Vitals:    09/29/22 1353   Pulse: 65   SpO2: 100%   Height: 5' 5" (1.651 m)   PainSc: 0-No pain        Past Surgical History:   Procedure Laterality Date    ANKLE FUSION Left 2011    x 3. Previously done in 1997 and 1999.    ANKLE SURGERY Left 1998    screw removal due to allergy to titanium.    ANKLE SURGERY Left 1992    Payne Procedure on left ankle.    CERVICAL FUSION  1985    CHOLECYSTECTOMY  2013    Due to Gallstones.    DILATION AND CURETTAGE OF UTERUS  1981    Due to miscarriage.    PAYNE CALCANEAL OSTEOTOMY  1992    HAND SURGERY Left 1978    LEG SURGERY Bilateral 1976    LEG SURGERY Bilateral 1977    staple removal    LUMBAR SYMPATHETIC NERVE BLOCK  1997, 1998, 1999, 2000, 2021    multiple    REPAIR OF MENISCUS OF KNEE Right 1998    SURGICAL REMOVAL OF BONE SPUR Left 1992    Ankle.    TENDON REPAIR Right 2019    ankle     Past Medical History:   Diagnosis Date    Cellulitis of left ankle 1998    Chronic pain 1990    Chronic pain     Colon cancer 2016    Concussion 1975    Diabetes 2010    Foot infection 1974    right    GERD (gastroesophageal reflux disease) 1992    Guillain Barré syndrome 1990    Hepatitis A 1995    Osteoarthritis 1990    Reflex sympathetic dystrophy 1997    RSD (reflex sympathetic dystrophy) 1992     Family History   Problem Relation Age of Onset    Cancer Paternal Grandmother         ovarian cancer    " Breast cancer Paternal Aunt         Social History:   Marital Status: Single  Alcohol History:  reports no history of alcohol use.  Tobacco History:  reports that she has never smoked. She has never used smokeless tobacco.  Drug History:  reports no history of drug use.    Review of patient's allergies indicates:   Allergen Reactions    Iodinated contrast media Other (See Comments)     Myelogram dye-causes seizures    Titanium analogues Swelling     Titanium screws     Cortisone Other (See Comments)     Cortisone injection  (RSDS)(CRPS)    Codeine Nausea And Vomiting and Rash    Darvon [propoxyphene] Nausea And Vomiting and Rash    Demerol [meperidine] Nausea And Vomiting and Rash    Opioids - morphine analogues Nausea And Vomiting and Rash    Paragoric Nausea And Vomiting and Rash    Wygesic Nausea And Vomiting and Rash       Current Outpatient Medications   Medication Sig Dispense Refill    aspirin (ECOTRIN) 81 MG EC tablet Take 81 mg by mouth once daily. Take one daily      atorvastatin (LIPITOR) 40 MG tablet Take 40 mg by mouth every evening.      carboxymethylcellulose (REFRESH PLUS) 0.5 % Dpet 1 drop 3 (three) times daily as needed.      ibuprofen-acetaminophen (ADVIL DUAL ACTION) 125-250 mg Tab Take by mouth every 8 (eight) hours. Take 2 twice a day      losartan (COZAAR) 50 MG tablet Take 1 tablet (50 mg total) by mouth once daily. 90 tablet 3    mag/aluminum/sod bicarb/alginc (GAVISCON ORAL) Take 0.5 fluid ounces by mouth once daily.      metFORMIN (GLUCOPHAGE) 500 MG tablet Take 1 tablet (500 mg total) by mouth 2 (two) times daily. 180 tablet 3    omeprazole (PRILOSEC) 20 MG capsule Take 1 capsule (20 mg total) by mouth 2 (two) times a day. 180 capsule 3    rosuvastatin (CRESTOR) 20 MG tablet Take 20 mg by mouth once daily.       No current facility-administered medications for this visit.       Review of Systems   Constitutional:  Negative for chills, fatigue, fever and unexpected weight change.   HENT:   Negative for hearing loss and trouble swallowing.    Eyes:  Negative for photophobia and visual disturbance.   Respiratory:  Negative for cough, shortness of breath and wheezing.    Cardiovascular:  Negative for chest pain, palpitations and leg swelling.   Gastrointestinal:  Negative for abdominal pain and nausea.   Genitourinary:  Negative for dysuria and frequency.   Musculoskeletal:  Negative for arthralgias, back pain, gait problem, joint swelling and myalgias.   Skin:  Negative for rash and wound.   Neurological:  Negative for tremors, seizures, weakness, numbness and headaches.   Hematological:  Bruises/bleeds easily.       Objective:        Post-op surgery of the left foot with normal healing, no signs of infection or dehiscence of wound. Normal post op exam today. No redness, no drainage, no increase in local temperature, no significant swelling, sutures.steri-strips are intact.     Imaging:  None    Physical Exam:   Foot Exam  Physical Exam       Assessment:       1. Mass of soft tissue of foot    2. Pain of left heel      Plan:   Mass of soft tissue of foot    Pain of left heel    Follow up if symptoms worsen or fail to improve.    Procedures - None    Surgical incision well healed.  At this time I have no restrictions for the patient from an activity or shoe gear standpoint.  Ice as needed for any residual pain or swelling.  She can otherwise increase her activities as tolerated.  As long as her symptoms continue to improve patient will be discharged from a surgical standpoint.    This note was created using Dragon voice recognition software that occasionally misinterpreted phrases or words.

## 2022-09-29 NOTE — PATIENT INSTRUCTIONS
Your Diabetes Foot Care Program    Every day you depend on your feet to keep you moving. But when you have diabetes, your feet need special care. Even a small foot problem can become very serious. So dont take your feet for granted. By working with your diabetes healthcare team, you can learn how to protect your feet and keep them healthy.  Evaluating your feet  An evaluation helps your healthcare provider check the condition of your feet. The evaluation includes a review of your diabetes history and overall health. It may also include a foot exam, X-rays, or other tests. These can help show problems beneath the skin that you cant see or feel.  Medical history  You will be asked about your overall health and any history of foot problems. Youll also discuss your diabetes history, such as whether your blood sugar level has changed over time. It also includes questions about sensations of pain, tingling, pins and needles, or numbness. Your healthcare provider will also want to know if you have high blood pressure and heart disease, or if you smoke. Be sure to mention any medicines (including over-the-counter), supplements, or herbal remedies you take.  Foot exam  A foot exam checks the condition of different parts of your foot. First, your skin and nails are examined for any signs of infection. Blood flow is checked by feeling for the pulses in each foot. You may also have tests to study the nerves in the foot. These include using a small filament (wire) to see how sensitive your feet are. In certain cases, you will be asked to walk a short distance to check for bone, joint, and muscle problems.  Diagnostic tests  If needed, your healthcare provider will suggest certain tests to learn more about your feet. These include:  Doppler tests to measure blood flow in the feet and lower leg.  X-rays, which can show bone or joint problems.  Other imaging tests, such as an MRI (magnetic resonance imaging), bone scan, and CT  (computed tomography) scan. These can help show bone infections.  Other tests, such as vascular tests, which study the blood flow in your feet and legs. You may also have nerve studies to learn how sensitive your feet are.  Creating a foot care program  Based on the evaluation, your healthcare provider will create a foot care program for you. Your program may be as simple as starting a daily self-care routine and changing the types of shoes your wear. It may also involve treating minor foot problems, such as a corn or blister. In some cases, surgery will be needed to treat an infection or mechanical problems, such as hammer toes.  Preventing problems  When you have diabetes, its easier to prevent problems than to treat them later on. So see your healthcare team for regular checkups and foot care. Your healthcare team can also help you learn more about caring for your feet at home. For example, you may be told to avoid walking barefoot. Or you may be told that special footwear is needed to protect your feet.  Have regular checkups  Foot problems can develop quickly. So be sure to follow your healthcare teams schedule for regular checkups. During office visits, take off your shoes and socks as soon as you get in the exam room. Ask your healthcare provider to examine your feet for problems. This will make it easier to find and treat small skin irritations before they get worse. Regular checkups can also help keep track of the blood flow and feeling in your feet. If you have neuropathy (lack of feeling in your feet), you will need to have checkups more often.  Learn about self-care  The more you know about diabetes and your feet, the easier it will be to prevent problems. Members of your healthcare team can teach you how to inspect your feet and teach you to look for warning signs. They can also give you other foot care tips. During office visits, be sure to ask any questions you have.  Date Last Reviewed: 7/1/2016  ©  6160-9625 [employer]. [address] [place] [address], [place] [address]. All rights reserved. This information is not intended as a substitute for professional medical care. Always follow your healthcare professional's instructions.

## 2022-10-05 ENCOUNTER — CLINICAL SUPPORT (OUTPATIENT)
Dept: REHABILITATION | Facility: HOSPITAL | Age: 62
End: 2022-10-05
Attending: PHYSICIAN ASSISTANT
Payer: COMMERCIAL

## 2022-10-05 ENCOUNTER — HOSPITAL ENCOUNTER (OUTPATIENT)
Dept: RADIOLOGY | Facility: HOSPITAL | Age: 62
Discharge: HOME | End: 2022-10-05
Attending: PHYSICIAN ASSISTANT
Payer: COMMERCIAL

## 2022-10-05 DIAGNOSIS — R09.A2 GLOBUS PHARYNGEUS: ICD-10-CM

## 2022-10-05 DIAGNOSIS — R13.10 DYSPHAGIA, UNSPECIFIED TYPE: ICD-10-CM

## 2022-10-05 PROCEDURE — 92610 EVALUATE SWALLOWING FUNCTION: CPT | Mod: PN

## 2022-10-05 PROCEDURE — 74230 X-RAY XM SWLNG FUNCJ C+: CPT | Mod: TC

## 2022-10-05 PROCEDURE — 74230 FL MODIFIED BARIUM SWALLOW SPEECH STUDY: ICD-10-PCS | Mod: 26,,, | Performed by: RADIOLOGY

## 2022-10-05 PROCEDURE — 92611 MOTION FLUOROSCOPY/SWALLOW: CPT | Mod: PN

## 2022-10-05 PROCEDURE — 74230 X-RAY XM SWLNG FUNCJ C+: CPT | Mod: 26,,, | Performed by: RADIOLOGY

## 2022-10-05 NOTE — PLAN OF CARE
[facility]  MODIFIED BARIUM SWALLOW STUDY      Date: 10/5/2022     Name: Daina Britt   MRN: [id number]    Therapy Diagnosis: WFL oral and pharyngeal phases of the swallow    Physician: Dedrick Wade,*  Physician Orders: SLP Video Swallow  Medical Diagnosis from Referral: Globus pharyngeus [R09.89], Dysphagia, unspecified type [R13.10]    Date of Evaluation:  10/5/2022    Time In:  1000  Time Out:  1030  Total Billable Time: 30     Procedure Min.   Swallow and Oral Function Evaluation   15   Fl Modified Barium Swallow Speech  15     Precautions: Standard    Subjective   Date of Onset: Around 5 years ago    Past Medical History: Daina Britt  has a past medical history of Cellulitis of left ankle (1998), Chronic pain (1990), Chronic pain, Colon cancer (2016), Concussion (1975), Diabetes (2010), Foot infection (1974), GERD (gastroesophageal reflux disease) (1992), Guillain Barré syndrome (1990), Hepatitis A (1995), Osteoarthritis (1990), Reflex sympathetic dystrophy (1997), and RSD (reflex sympathetic dystrophy) (1992).  Daina Britt  has a past surgical history that includes Leg Surgery (Bilateral, 1976); Leg Surgery (Bilateral, 1977); Hand surgery (Left, 1978); Cervical fusion (1985); Surgical removal of bone spur (Left, 1992); Payne calcaneal osteotomy (1992); Lumbar sympathetic nerve block (1997, 1998, 1999, 2000, 2021); Ankle Fusion (Left, 2011); Repair of meniscus of knee (Right, 1998); Ankle surgery (Left, 1998); Cholecystectomy (2013); Tendon repair (Right, 2019); Dilation and curettage of uterus (1981); and Ankle surgery (Left, 1992).    The patient is a 62 y.o. female who complains of difficulty swallowing liquids.     History was provided by patient and/or taken from chart review:   -Current diet at home: full oral - regular/thin liquids  -Recommended diet from previous study: n/a  -Therapy received: n/a  -Neurological: Pt denied any neurological  diagnoses.  -Gastroenterologist (GI) : Pt endorsed GI diagnosis of GERD and Reflux. Pt on daily medication, Prilosec. Pt denied all other GI diagnoses.   -Pulmonary: Pt denied any pulmonary diagnoses.  and Pt denied a history of aspiration pneumonia within the past 2 years.  -Surgery:   Past Surgical History:   Procedure Laterality Date    ANKLE FUSION Left 2011    x 3. Previously done in 1997 and 1999.    ANKLE SURGERY Left 1998    screw removal due to allergy to titanium.    ANKLE SURGERY Left 1992    La Procedure on left ankle.    CERVICAL FUSION  1985    CHOLECYSTECTOMY  2013    Due to Gallstones.    DILATION AND CURETTAGE OF UTERUS  1981    Due to miscarriage.    LA CALCANEAL OSTEOTOMY  1992    HAND SURGERY Left 1978    LEG SURGERY Bilateral 1976    LEG SURGERY Bilateral 1977    staple removal    LUMBAR SYMPATHETIC NERVE BLOCK  1997, 1998, 1999, 2000, 2021    multiple    REPAIR OF MENISCUS OF KNEE Right 1998    SURGICAL REMOVAL OF BONE SPUR Left 1992    Ankle.    TENDON REPAIR Right 2019    ankle       -Cancer: n/a    Patient reports changes in voice 1.5-2 years - patient reports difficulty regarding upper registers.    The following observations were made:   -Mental status: Alert and Cooperative  -Factors affecting performance: no difficulties participating in the study  -Feeding Method: independent in self-feeding    Respiratory Status:   -Respiratory Status: room air    Medical Hx and Allergies:    Review of patient's allergies indicates:   Allergen Reactions    Iodinated contrast media Other (See Comments)     Myelogram dye-causes seizures    Titanium analogues Swelling     Titanium screws     Cortisone Other (See Comments)     Cortisone injection  (RSDS)(CRPS)    Codeine Nausea And Vomiting and Rash    Darvon [propoxyphene] Nausea And Vomiting and Rash    Demerol [meperidine] Nausea And Vomiting and Rash    Opioids - morphine analogues Nausea And Vomiting and Rash    Paragoric Nausea And Vomiting and  "Rash    Wygesic Nausea And Vomiting and Rash       Pain Scale:  0/10 on VAS currently.   Pain Location: no pain indicated across session    Objective     Modified Barium Swallow Study  Purpose: to evaluate anatomy and physiology of the oropharyngeal swallow, to determine effectiveness of rehabilitation strategies, and to determine diet consistency and intervention recommendations. The study was performed using the "Gold Standard" of 30 fps with as low as reasonably achievable (ALARA) exposure.     The patient was seen in radiology seated in [place]'s position in a video imaging chair for lateral views of the larynx and an A/P view. The study was conducted using Varibar thin liquid (IDDSI 0), Varibar nectar liquid (IDDSI 2), Varibar pudding (IDDSI 4), Peaches covered in Varibar powder (IDDSI 6/2), and solid coated in Varibar pudding (IDDSI 7). She tolerated the procedure well.     A cranial nerve examination revealed the following:  Cranial Nerve Examination  Cranial Nerve 5: Trigeminal Nerve  Motor Jaw Posture at rest: Closed  Mandible Elevation/Depression: WFL  Mandible lateralization: WFL  Abnormal movement: absent Interpretation: Intact bilaterally    Sensory Forehead: WFL  Cheek: WFL  Jaw: WFL  Facial Pain: None noted Interpretation: Intact bilaterally      Cranial Nerve 7: Facial Nerve  Motor Facial Symmetry: WNL  Wrinkle Forehead: WFL  Close eyes tightly: WFL  Labial Protrusion: WFL  Labial Retraction: WFL  Buccal Strength with Labial Seal: WFL  Abnormal movement: absent Interpretation: Intact bilaterally    Sensory Formal testing not completed.       Cranial Nerves IX and X: Glossopharyngeal and Vagus Nerves  Motor Palatal Symmetry (Rest): WNL  Palatal Symmetry (Movement): WNL  Cough: Perceptually strong  Voice Prior to PO intake: Clear  Resonance: Normal  Abnormal movement: absent Interpretation: Intact bilaterally      Cranial Nerve XII: Hypoglossal Nerve  Motor Tongue at rest: WNL  Lingual Protrusion: " WNL  Lingual Protrusion against Resistance: WNL  Lingual Lateralization: WNL  Abnormal movement: absent Interpretation: Intact bilaterally      Other information:  Volitional Swallow: Able to palpate laryngeal rise  Mucosal Quality: No abnormal findings  Secretion Management: no overt deficits noted/observed  Dentition: Good condition for speech and mastication       CONSISTENCIES ADMINISTERED:    Consistency  Presentation  Findings Rosenbeck's Penetration/Aspiration Scale (PAS) Strategy Attempted    Thin (IDDSI 0) 5ml x2 10 ml x2 Self-regulated cup sip x2 Rapid consecutive cup sip x1 Self-regulated straw sip x2 Rapid consecutive straw sip x1    Views:  - Lateral view  - AP view Oral phase:  none-trace residue noted on the tongue which wascompletely reduced by spontaneous swallow    Pharyngeal phase:  trace residue noted in the vallecula which wascompletely reduced by spontaneous swallow and no pyriform sinus residue noted    Esophageal screen: delayed emptying/retention of bolus Best: (1) Material does not enter the airway    Worst: (1) Material does not enter the airway   No strategies completed or needed   Nectar thick (mildly thick/IDDSI 2) 5ml x2 10 ml x2    Views:  - Lateral view  - AP view Oral phase: none-trace residue noted on the tongue which wascompletely reduced by spontaneous swallow    Pharyngeal phase: trace residue noted in the vallecula which wascompletely reduced by spontaneous swallow and no pyriform sinus residue noted    Esophogeal screen: aerophagia and delayed emptying/retention of bolus   Best: (1) Material does not enter the airway    Worst: (1) Material does not enter the airway   No strategies completed or needed   Puree (extremely thick/ IDDSI 4) 5ml x2  10 ml x1    Views:  - Lateral view  - AP view Oral phase:  none-trace residue noted on the tongue which wassuccessfully reduced by spontaneous swallow    Pharyngeal phase: WFL    Esophageal screen: delayed emptying/retention of bolus    Best: (1) Material does not enter the airway    Worst: (1) Material does not enter the airway   No strategies completed or needed   Mixed consistency (thin/ IDDSI 0 + soft and bite sized/ IDDSI 6) - 2 peaches in juice x2    View:  - Lateral view   Oral phase: trace residue noted on the tongue which wassuccessfully reduced by spontaneous swallow    Pharyngeal phase: WFL     Best: (1) Material does not enter the airway    Worst: (1) Material does not enter the airway   No strategies completed or needed   Solid (regular/ IDDSI 7) - bite of tammy cracker x2    View:  - Lateral view  Oral phase: WFL    Pharyngeal phase: WFL    Esophageal screen: delayed emptying/retention of bolus Best: (1) Material does not enter the airway    Worst: (1) Material does not enter the airway   No strategies completed or needed       Treatment   Total Treatment Time: n/a  Patient educated regarding results and recommendations of the evaluation. See the recommendations section below.    Education: Plan of Care, role of SLP in care, and anatomy and physiology of swallow mechanism as it relates to MBSS findings and recommendations were discussed with the patient. Patient expressed understanding. All questions were answered.     Assessment     Daina Britt is a 62 y.o. female referred for Modified Barium Swallow Study with a medical diagnosis of globus pharyngeus and unspecified dysphagia. The patient presents with Within Functional Limits  oropharyngeal dysphagia as function by the Dysphagia Outcome and Severity Scale (CHRISTIE). Level 6: WFL/ Modified Baxter.    Modified Barium Swallow Study (MBSS) revealed oral phase characterized by lingual and labial strength and range of motion WFL for tongue control, bolus preparation and transport. Lip closure was WFL with no labial escape. Bolus prep and mastication was timely and efficient. Lingual motion was brisk for adequate bolus transport. There was no significant oral residue. The swallow  was initiated when the head of the bolus entered the valleculae.    Pharyngeal phase characterized by timely initiation of swallow. The soft palate elevated for complete closure of the velopharyngeal port. During pharyngeal swallow, aryngeal elevation, and pharyngeal stripping wave were WFL resulting in complete epiglottic inversion and UES opening. Tongue based traction as well as hyoid excursion noted to be reduced. No penetration, aspiration, or significant pharyngeal residue was observed.     Esophageal screen characterized by aerophagia. and Esophageal screen characterized by delayed esophageal emptying.    Impressions: Patient presents with WFL oral and pharyngeal phases of the swallow. Both swallow safety and swallow efficiency are preserved,. Patient appears to be at low risk for aspiration related pneumonia from a primary oropharyngeal dysphagia in consideration of three pillars of aspiration pneumonia (Marjan, 2005) including oral health status, overall health/immune status, and laryngeal vestibule closure/severity of dysphagia. However, unable to assess risk related to aspiration pneumonia cause by the aspiration of gastric content. No further speech therapy intervention needed for swallow rehabilitation    References:  CLAUS Phillip (2005, March). Pneumonia: Factors Beyond Aspiration. Perspectives in Swallowing and Swallowing Disorders (Dysphagia), 14, 10-16.    Recommendations:     Consistency Recommendations:  THIN liquids (IDDSI 0) and REGULAR consistencies (IDDSI 7).   Risk Management: use good oral hygiene , sit upright for all PO intake, increase physical mobility as tolerated, behavioral reflux precautions, alternate bites and sips, small bites and sips, multiple swallows per bolus, and remain upright for at least 1-2 hours following any PO intake  Specialist Referrals: GI, Speech therapy consult for complaints regarding vocal quality    Therapy: Dysphagia therapy is not recommended at this  time.  Follow-up exam: Follow up swallow study is not indicated at this time.    Please contact [facility] at [phone] if there are questions re: the above or if we can be of additional service to this patient.    Therapist's Name:   Astrid Chu CCC-SLP   Speech Language Pathologist  Certified Brain Injury Specialist    Date: 10/5/2022

## 2022-10-05 NOTE — PROGRESS NOTES
See Modified Barium Swallow Study results in Plan of Care.    SVETLANA Santiago, CCC-SLP, CBIS  Speech-Language Pathology  10/5/2022

## 2022-10-10 ENCOUNTER — TELEPHONE (OUTPATIENT)
Dept: OTOLARYNGOLOGY | Facility: CLINIC | Age: 62
End: 2022-10-10
Payer: COMMERCIAL

## 2022-10-10 DIAGNOSIS — R13.19 ESOPHAGEAL DYSPHAGIA: Primary | ICD-10-CM

## 2022-10-10 NOTE — TELEPHONE ENCOUNTER
Spoke w/pt. Reviewed following result note w/pt, per provider. Pt verbalized understanding. Pt was unable to schedule GI appt at this time. She will be scheduling appt from referral on [facility]; will call scheduling line if she has difficulties scheduling.

## 2022-10-10 NOTE — TELEPHONE ENCOUNTER
----- Message from Dedrick Wade PA-C sent at 10/10/2022  8:38 AM CDT -----  Regarding: dysphagia  Please tell pt that her MBSS showed delayed esophageal emptying. Pt would benefit from further workup for dysphagia with GI. GI order has been placed.

## 2022-10-11 ENCOUNTER — TELEPHONE (OUTPATIENT)
Dept: GASTROENTEROLOGY | Facility: CLINIC | Age: 62
End: 2022-10-11
Payer: COMMERCIAL

## 2022-10-11 NOTE — TELEPHONE ENCOUNTER
Sent message to portal for patient to schedule an appointment with our office. 3 phone attempts to schedule patient for office visit from referral.

## 2022-10-19 ENCOUNTER — OFFICE VISIT (OUTPATIENT)
Dept: OTOLARYNGOLOGY | Facility: CLINIC | Age: 62
End: 2022-10-19
Payer: COMMERCIAL

## 2022-10-19 VITALS — HEIGHT: 66 IN | WEIGHT: 178.13 LBS | RESPIRATION RATE: 16 BRPM | BODY MASS INDEX: 28.63 KG/M2

## 2022-10-19 DIAGNOSIS — R09.A2 GLOBUS SENSATION: Primary | ICD-10-CM

## 2022-10-19 PROCEDURE — 99213 PR OFFICE/OUTPT VISIT, EST, LEVL III, 20-29 MIN: ICD-10-PCS | Mod: S$GLB,,, | Performed by: PHYSICIAN ASSISTANT

## 2022-10-19 PROCEDURE — 99999 PR PBB SHADOW E&M-EST. PATIENT-LVL III: ICD-10-PCS | Mod: PBBFAC,,, | Performed by: PHYSICIAN ASSISTANT

## 2022-10-19 PROCEDURE — 99213 OFFICE O/P EST LOW 20 MIN: CPT | Mod: S$GLB,,, | Performed by: PHYSICIAN ASSISTANT

## 2022-10-19 PROCEDURE — 99999 PR PBB SHADOW E&M-EST. PATIENT-LVL III: CPT | Mod: PBBFAC,,, | Performed by: PHYSICIAN ASSISTANT

## 2022-10-19 NOTE — PROGRESS NOTES
[facility]    Subjective:      Patient: Daina Britt Patient PCP: Ervin Garnica MD         :  1960     Sex:  female      MRN:  [id number]          Date of Visit: 10/19/2022      Chief Complaint: Globus sensation follow up     Interval History 10/19/2022: Pt had swallow study completed for dysphagia with esophageal screen characterized by aerophagia and delayed esophageal emptying. Pt has appt set up with GI to further assess. Pt states that throat symptoms have improved since last visit. Pt no longer has globus sensation. Pt has been continuing her prilosec 20mg twice daily and taking gaviscon after dinner and prior to bedtime for acid reflux symptoms.     Patient ID 2022: Daina Britt is a 62 y.o. female who was referred to me by Dr. Ervin Garnica in consultation for  globus pharyngeus . Pt seen by family medicine 2022 with complaints of occasional globus sensation. Pt has h/o GERD and takes prilosec 20mg BID. Pt states that she has had chronic globus sensation for around 3.5 years. Pt states that she rarely has to force liquids down her throat. Pt states that this occurs with liquids, not solids. Pt denies throat pain on a regular basis, but states she always feels as though she has something stuck in her throat. Pt states she has had voice change with globus sensation and pt used to be a jones. Pt states that her voice has become raspier. Pt states that if she talks a lot, she will get a cough and have to get a drink to clear her throat. Pt denies smoking history. Pt states she does not have issues with acid reflux. Pt denies fever/chills, drenching night sweats, PND, or unintentional weight loss.     Current job:      Past Medical History  She has a past medical history of Cellulitis of left ankle, Chronic pain, Chronic pain, Colon cancer, Concussion, Diabetes, Foot infection, GERD (gastroesophageal reflux disease), Guillain Barré syndrome, Hepatitis A, Osteoarthritis, Reflex  sympathetic dystrophy, and RSD (reflex sympathetic dystrophy).    Family History  Her family history includes Breast cancer in her paternal aunt; Cancer in her paternal grandmother.    Past Surgical History:   Procedure Laterality Date    ANKLE FUSION Left 2011    x 3. Previously done in 1997 and 1999.    ANKLE SURGERY Left 1998    screw removal due to allergy to titanium.    ANKLE SURGERY Left 1992    [place] Procedure on left ankle.    CERVICAL FUSION  1985    CHOLECYSTECTOMY  2013    Due to Gallstones.    DILATION AND CURETTAGE OF UTERUS  1981    Due to miscarriage.    LA CALCANEAL OSTEOTOMY  1992    HAND SURGERY Left 1978    LEG SURGERY Bilateral 1976    LEG SURGERY Bilateral 1977    staple removal    LUMBAR SYMPATHETIC NERVE BLOCK  1997, 1998, 1999, 2000, 2021    multiple    REPAIR OF MENISCUS OF KNEE Right 1998    SURGICAL REMOVAL OF BONE SPUR Left 1992    Ankle.    TENDON REPAIR Right 2019    ankle     Social History     Tobacco Use    Smoking status: Never    Smokeless tobacco: Never   Substance and Sexual Activity    Alcohol use: Never    Drug use: Never    Sexual activity: Not Currently     Medications  She has a current medication list which includes the following prescription(s): aspirin, atorvastatin, carboxymethylcellulose, advil dual action, losartan, mag/aluminum/sod bicarb/alginc, metformin, omeprazole, and rosuvastatin.    Review of patient's allergies indicates:   Allergen Reactions    Iodinated contrast media Other (See Comments)     Myelogram dye-causes seizures    Titanium analogues Swelling     Titanium screws     Cortisone Other (See Comments)     Cortisone injection  (RSDS)(CRPS)    Codeine Nausea And Vomiting and Rash    Darvon [propoxyphene] Nausea And Vomiting and Rash    Demerol [meperidine] Nausea And Vomiting and Rash    Opioids - morphine analogues Nausea And Vomiting and Rash    Paragoric Nausea And Vomiting and Rash    Wygesic Nausea And Vomiting and Rash     All medications,  allergies, and past history have been reviewed.    Objective:      Vitals:  Vitals - 1 value per visit 9/29/2022 10/19/2022 10/19/2022   SYSTOLIC - - -   DIASTOLIC - - -   Pulse 65 - -   Temp - - -   Resp - - 16   SPO2 100 - -   Weight (lb) - - 178.13   Weight (kg) - - 80.8   Height 65 - 65.5   BMI (Calculated) - - 29.2   VISIT REPORT - - -   Pain Score  - 0 -       Body surface area is 1.93 meters squared.  Physical Exam  Constitutional:       General: She is not in acute distress.     Appearance: Normal appearance. She is not ill-appearing.   HENT:      Head: Normocephalic and atraumatic.      Right Ear: Tympanic membrane, ear canal and external ear normal.      Left Ear: Tympanic membrane, ear canal and external ear normal.      Nose: Septal deviation present.      Mouth/Throat:      Lips: Pink. No lesions.      Mouth: Mucous membranes are moist. No oral lesions.      Tongue: No lesions.      Palate: No lesions.      Pharynx: Oropharynx is clear. Uvula midline. No pharyngeal swelling, oropharyngeal exudate, posterior oropharyngeal erythema or uvula swelling.      Tonsils: No tonsillar exudate or tonsillar abscesses.      Comments: [place] tonsils 1-2+ bilaterally with tonsillar crypts.   Eyes:      General:         Right eye: No discharge.         Left eye: No discharge.      Extraocular Movements: Extraocular movements intact.      Conjunctiva/sclera: Conjunctivae normal.   Pulmonary:      Effort: Pulmonary effort is normal.   Lymphadenopathy:      Cervical: No cervical adenopathy.   Neurological:      General: No focal deficit present.      Mental Status: She is alert and oriented to person, place, and time. Mental status is at baseline.   Psychiatric:         Mood and Affect: Mood normal.         Behavior: Behavior normal.         Thought Content: Thought content normal.         Judgment: Judgment normal.   Laryngoscopy from 08/24/2022     Date/Time: 8/24/2022 8:45 AM  Performed by: Dedrick Wade,  AYO  Authorized by: Dedrick Wade PA-C      Consent Done?:  Yes (Verbal)  Anesthesia:     Local anesthetic:  Lidocaine 4% and Erwin-Synephrine 1/2%    Patient tolerance:  Patient tolerated the procedure well with no immediate complications    Decongestion performed?: Yes    Laryngoscopy:     Areas examined:  Nasopharynx, oropharynx, hypopharynx, larynx and vocal cords    Laryngoscope size: disposable flexible ambu scope.  Nose External:      No external nasal deformity  Nasopharynx:      No mucosa lesions     Eustachian tube patent  Larynx/hypopharynx:      No epiglottis lesions     No epiglottis edema     No AE folds lesions     No vocal cord polyps     No hypopharynx lesions     No piriform sinus pooling     No piriform sinus lesions     Post cricoid edema     Post cricoid erythema       Good abduction of vocal cords with breathing and adduction with phonation. No pareses or paralysis of vocal cords. Mild bilateral vocal cord atrophy with vocal cord bowing.     Labs:  WBC   Date Value Ref Range Status   08/12/2022 5.45 3.90 - 12.70 K/uL Final     Eosinophil %   Date Value Ref Range Status   08/12/2022 1.5 0.0 - 8.0 % Final     Eos #   Date Value Ref Range Status   08/12/2022 0.1 0.0 - 0.5 K/uL Final     Platelets   Date Value Ref Range Status   08/12/2022 188 150 - 450 K/uL Final     Glucose   Date Value Ref Range Status   08/12/2022 112 (H) 70 - 110 mg/dL Final     All lab results, imaging results, and data have been reviewed.    Assessment:        ICD-10-CM ICD-9-CM   1. Globus sensation  R09.89 784.99           Plan:      Globus sensation  -Resolved. Continue gaviscon after dinner prior to bedtime. Pt may continue her prilosec 20mg twice daily. Pt is to follow up with GI for assessment of dysphagia. Pt may follow up as needed for ENT issues/concerns.

## 2022-11-01 ENCOUNTER — OFFICE VISIT (OUTPATIENT)
Dept: GASTROENTEROLOGY | Facility: CLINIC | Age: 62
End: 2022-11-01
Payer: COMMERCIAL

## 2022-11-01 VITALS
WEIGHT: 173.06 LBS | HEIGHT: 66 IN | SYSTOLIC BLOOD PRESSURE: 132 MMHG | HEART RATE: 97 BPM | BODY MASS INDEX: 27.81 KG/M2 | DIASTOLIC BLOOD PRESSURE: 92 MMHG

## 2022-11-01 DIAGNOSIS — R49.0 HOARSE VOICE QUALITY: ICD-10-CM

## 2022-11-01 DIAGNOSIS — Z86.010 HISTORY OF COLON POLYPS: ICD-10-CM

## 2022-11-01 DIAGNOSIS — R10.30 LOWER ABDOMINAL PAIN: ICD-10-CM

## 2022-11-01 DIAGNOSIS — R05.9 COUGH, UNSPECIFIED TYPE: ICD-10-CM

## 2022-11-01 DIAGNOSIS — R13.10 DYSPHAGIA, UNSPECIFIED TYPE: Primary | ICD-10-CM

## 2022-11-01 DIAGNOSIS — J02.9 SORE THROAT: ICD-10-CM

## 2022-11-01 DIAGNOSIS — R14.2 BELCHING: ICD-10-CM

## 2022-11-01 DIAGNOSIS — R09.A2 GLOBUS SENSATION: ICD-10-CM

## 2022-11-01 DIAGNOSIS — K21.9 GASTROESOPHAGEAL REFLUX DISEASE, UNSPECIFIED WHETHER ESOPHAGITIS PRESENT: ICD-10-CM

## 2022-11-01 DIAGNOSIS — Z85.038 HISTORY OF COLON CANCER: ICD-10-CM

## 2022-11-01 PROCEDURE — 99999 PR PBB SHADOW E&M-EST. PATIENT-LVL V: CPT | Mod: PBBFAC,,,

## 2022-11-01 PROCEDURE — 99204 PR OFFICE/OUTPT VISIT, NEW, LEVL IV, 45-59 MIN: ICD-10-PCS | Mod: S$GLB,,,

## 2022-11-01 PROCEDURE — 99999 PR PBB SHADOW E&M-EST. PATIENT-LVL V: ICD-10-PCS | Mod: PBBFAC,,,

## 2022-11-01 PROCEDURE — 99204 OFFICE O/P NEW MOD 45 MIN: CPT | Mod: S$GLB,,,

## 2022-11-01 NOTE — PROGRESS NOTES
Subjective:       Patient ID: Daina Britt is a 62 y.o. female Body mass index is 28.36 kg/m².    Chief Complaint: Dysphagia    This patient is new to me.     Gastroesophageal Reflux  She complains of abdominal pain (denies pain currently; occurs after eating; intermittent sharp pain to lower abdomen), belching, coughing (sounds wet, but nothing comes up), dysphagia (occurs itnermittently with liquid), globus sensation, a hoarse voice (reports her singing has been affected; seeing speech therapy Thursday) and a sore throat. She reports no chest pain, no choking, no early satiety, no heartburn (occurs if she misses PPI dose), no nausea or no water brash. This is a chronic problem. The current episode started more than 1 year ago. The problem occurs rarely. The problem has been resolved. Nothing aggravates the symptoms. Pertinent negatives include no anemia, fatigue, melena, muscle weakness or weight loss. Risk factors include NSAIDs (takes 2 dual action Advil BID). She has tried a PPI and an antacid (currently taking prilosec 20 mg BID & gaviscon) for the symptoms. The treatment provided significant (reports medication is helping with heartburn/reflux) relief. Past procedures include a UGI (barium swallow 10/06/22 - There is no prevertebral soft tissue swelling.  There is no large anterior osteophyte that would be thought to result in dysphagia.  A necklace is noted.  The eating implications will be dictated separately). Past procedures do not include an abdominal ultrasound, an EGD, esophageal manometry, esophageal pH monitoring or H. pylori antibody titer. 09/04/22 FIT negative. Past invasive treatments do not include gastroplasty, gastroplication or reflux surgery.     Review of Systems   Constitutional:  Negative for activity change, appetite change, chills, diaphoresis, fatigue, fever, unexpected weight change and weight loss.   HENT:  Positive for hoarse voice (reports her singing has been affected; seeing  speech therapy Thursday), sore throat and trouble swallowing.    Respiratory:  Positive for cough (sounds wet, but nothing comes up). Negative for choking and shortness of breath.    Cardiovascular:  Negative for chest pain.   Gastrointestinal:  Positive for abdominal pain (denies pain currently; occurs after eating; intermittent sharp pain to lower abdomen) and dysphagia (occurs itnermittently with liquid). Negative for abdominal distention, anal bleeding, blood in stool, constipation, diarrhea, heartburn (occurs if she misses PPI dose), melena, nausea, rectal pain and vomiting.   Musculoskeletal:  Negative for muscle weakness.       No LMP recorded. Patient is postmenopausal.  Past Medical History:   Diagnosis Date    Cellulitis of left ankle 1998    Chronic pain 1990    Chronic pain     Colon cancer 2016    Colon polyp     Concussion 1975    Diabetes 2010    Foot infection 1974    right    GERD (gastroesophageal reflux disease) 1992    Guillain Barré syndrome 1990    Hepatitis A 1995    Osteoarthritis 1990    Reflex sympathetic dystrophy 1997    RSD (reflex sympathetic dystrophy) 1992     Past Surgical History:   Procedure Laterality Date    ANKLE FUSION Left 2011    x 3. Previously done in 1997 and 1999.    ANKLE SURGERY Left 1998    screw removal due to allergy to titanium.    ANKLE SURGERY Left 1992    [place] Procedure on left ankle.    CERVICAL FUSION  1985    CHOLECYSTECTOMY  2013    Due to Gallstones.    COLONOSCOPY      DILATION AND CURETTAGE OF UTERUS  1981    Due to miscarriage.    BESSIE CALCANEAL OSTEOTOMY  1992    HAND SURGERY Left 1978    LEG SURGERY Bilateral 1976    LEG SURGERY Bilateral 1977    staple removal    LUMBAR SYMPATHETIC NERVE BLOCK  1997, 1998, 1999, 2000, 2021    multiple    REPAIR OF MENISCUS OF KNEE Right 1998    SURGICAL REMOVAL OF BONE SPUR Left 1992    Ankle.    TENDON REPAIR Right 2019    ankle    UPPER GASTROINTESTINAL ENDOSCOPY       Family History   Problem Relation Age of Onset     Schrader's esophagus Mother     Breast cancer Paternal Aunt     Colon cancer Maternal Grandmother     Cancer Paternal Grandmother         ovarian cancer    Colon polyps Neg Hx     Stomach cancer Neg Hx      Social History     Tobacco Use    Smoking status: Never    Smokeless tobacco: Never   Substance Use Topics    Alcohol use: Never    Drug use: Never     Wt Readings from Last 10 Encounters:   11/01/22 78.5 kg (173 lb 1 oz)   10/19/22 80.8 kg (178 lb 2.1 oz)   09/01/22 79.8 kg (176 lb)   08/24/22 79.9 kg (176 lb 2.4 oz)   08/22/22 79.4 kg (175 lb)   08/19/22 79.7 kg (175 lb 11.3 oz)   08/16/22 79.3 kg (174 lb 13.2 oz)   08/12/22 79.4 kg (175 lb)   08/09/22 79.4 kg (175 lb)   07/29/22 79.7 kg (175 lb 11.3 oz)     Lab Results   Component Value Date    WBC 5.45 08/12/2022    HGB 13.0 08/12/2022    HCT 40.4 08/12/2022    MCV 89 08/12/2022     08/12/2022     CMP  Sodium   Date Value Ref Range Status   08/12/2022 141 136 - 145 mmol/L Final     Potassium   Date Value Ref Range Status   08/12/2022 4.4 3.5 - 5.1 mmol/L Final     Chloride   Date Value Ref Range Status   08/12/2022 106 95 - 110 mmol/L Final     CO2   Date Value Ref Range Status   08/12/2022 28 23 - 29 mmol/L Final     Glucose   Date Value Ref Range Status   08/12/2022 112 (H) 70 - 110 mg/dL Final     BUN   Date Value Ref Range Status   08/12/2022 15 6 - 20 mg/dL Final     Creatinine   Date Value Ref Range Status   08/12/2022 0.6 0.5 - 1.4 mg/dL Final     Calcium   Date Value Ref Range Status   08/12/2022 9.9 8.7 - 10.5 mg/dL Final     Total Protein   Date Value Ref Range Status   08/12/2022 7.1 6.0 - 8.4 g/dL Final     Albumin   Date Value Ref Range Status   08/12/2022 4.4 3.5 - 5.2 g/dL Final     Total Bilirubin   Date Value Ref Range Status   08/12/2022 0.7 0.1 - 1.0 mg/dL Final     Comment:     For infants and newborns, interpretation of results should be based  on gestational age, weight and in agreement with clinical  observations.    Premature  Infant recommended reference ranges:  Up to 24 hours.............<8.0 mg/dL  Up to 48 hours............<12.0 mg/dL  3-5 days..................<15.0 mg/dL  6-29 days.................<15.0 mg/dL       Alkaline Phosphatase   Date Value Ref Range Status   08/12/2022 76 55 - 135 U/L Final     AST   Date Value Ref Range Status   08/12/2022 22 10 - 40 U/L Final     ALT   Date Value Ref Range Status   08/12/2022 25 10 - 44 U/L Final     Anion Gap   Date Value Ref Range Status   08/12/2022 7 (L) 8 - 16 mmol/L Final     eGFR if    Date Value Ref Range Status   01/20/2022 >60.0 >60 mL/min/1.73 m^2 Final     eGFR if non    Date Value Ref Range Status   01/20/2022 >60.0 >60 mL/min/1.73 m^2 Final     Comment:     Calculation used to obtain the estimated glomerular filtration  rate (eGFR) is the CKD-EPI equation.        Reviewed prior medical records including radiology report of barium swallow 10/06/22, chest x-ray 08/12/22 & endoscopy history (see surgical history).    Objective:      Physical Exam  Vitals and nursing note reviewed.   Constitutional:       General: She is not in acute distress.     Appearance: Normal appearance. She is not ill-appearing.   HENT:      Mouth/Throat:      Lips: Pink. No lesions.   Eyes:      Extraocular Movements: Extraocular movements intact.      Pupils: Pupils are equal, round, and reactive to light.   Cardiovascular:      Rate and Rhythm: Normal rate and regular rhythm.      Pulses: Normal pulses.   Pulmonary:      Effort: Pulmonary effort is normal. No respiratory distress.      Breath sounds: Normal breath sounds.   Abdominal:      General: Abdomen is protuberant. Bowel sounds are normal. There is no distension or abdominal bruit. There are no signs of injury.      Palpations: Abdomen is soft. There is no shifting dullness, fluid wave, hepatomegaly, splenomegaly or mass.      Tenderness: There is no abdominal tenderness. There is no guarding or rebound.  Negative signs include Gusman's sign, Rovsing's sign and McBurney's sign.      Hernia: No hernia is present.   Skin:     General: Skin is warm and dry.      Coloration: Skin is not jaundiced or pale.   Neurological:      Mental Status: She is alert and oriented to person, place, and time.   Psychiatric:         Attention and Perception: Attention normal.         Mood and Affect: Mood normal.         Speech: Speech normal.         Behavior: Behavior normal.       Assessment:       1. Dysphagia, unspecified type    2. Gastroesophageal reflux disease, unspecified whether esophagitis present    3. Belching    4. Cough, unspecified type    5. Globus sensation    6. Sore throat    7. Hoarse voice quality    8. Lower abdominal pain    9. History of colon polyps    10. History of colon cancer        Plan:       Dysphagia, unspecified type  - schedule EGD, discussed procedure with patient and possible esophageal dilation may be performed during procedure if indicated, patient verbalized understanding  - educated patient to eat smaller more frequent meals and to eat slowly and advised to eat a soft diet.  - possible UGI/esophagram/esophageal manometry if symptoms persist  -     Case Request Endoscopy: EGD (ESOPHAGOGASTRODUODENOSCOPY)    Gastroesophageal reflux disease, unspecified whether esophagitis present  - schedule EGD, discussed procedure with patient, including risks and benefits, patient verbalized understanding  -Educated patient on lifestyle modifications to help control/reduce reflux/abdominal pain including: avoid large meals, avoid eating within 2-3 hours of bedtime (avoid late night eating & lying down soon after eating), elevate head of bed if nocturnal symptoms are present, smoking cessation (if current smoker), & weight loss (if overweight).   -Educated to avoid known foods which trigger reflux symptoms & to minimize/avoid high-fat foods, chocolate, caffeine, citrus, alcohol, & tomato products.  -Advised to  avoid/limit use of NSAID's, since they can cause GI upset, bleeding, and/or ulcers. If needed, take with food.   -CONTINUE PRILOSEC 20 MG BID & GAVISCON AS PRESCRIBED  -     Case Request Endoscopy: EGD (ESOPHAGOGASTRODUODENOSCOPY)    Belching  - schedule EGD, discussed procedure with patient, including risks and benefits, patient verbalized understanding    Cough, unspecified type  - schedule EGD, discussed procedure with patient, including risks and benefits, patient verbalized understanding    Globus sensation  - schedule EGD, discussed procedure with patient, including risks and benefits, patient verbalized understanding    Sore throat  - schedule EGD, discussed procedure with patient, including risks and benefits, patient verbalized understanding    Hoarse voice quality  - schedule EGD, discussed procedure with patient, including risks and benefits, patient verbalized understanding    Lower abdominal pain  - schedule Colonoscopy, discussed procedure with the patient, including risks and benefits, patient verbalized understanding  -If pain returns, consider abdominal imaging to further investigate    History of colon polyps & History of colon cancer  - schedule Colonoscopy, discussed procedure with the patient, including risks and benefits, patient verbalized understanding    Follow up in about 4 weeks (around 11/29/2022), or if symptoms worsen or fail to improve.      If no improvement in symptoms or symptoms worsen, call/follow-up at clinic or go to ER.        45 minutes of total time spent on the encounter, which includes face to face time and non-face to face time preparing to see the patient (eg, review of tests), Obtaining and/or reviewing separately obtained history, Documenting clinical information in the electronic or other health record, Independently interpreting results (not separately reported) and communicating results to the patient/family/caregiver, or Care coordination (not separately reported).

## 2022-11-03 ENCOUNTER — CLINICAL SUPPORT (OUTPATIENT)
Dept: REHABILITATION | Facility: HOSPITAL | Age: 62
End: 2022-11-03
Payer: COMMERCIAL

## 2022-11-03 DIAGNOSIS — R49.0 DYSPHONIA: ICD-10-CM

## 2022-11-03 DIAGNOSIS — J38.3 VOCAL CORD BOWING: ICD-10-CM

## 2022-11-03 PROCEDURE — 92507 TX SP LANG VOICE COMM INDIV: CPT | Mod: PN

## 2022-11-03 PROCEDURE — 92524 BEHAVRAL QUALIT ANALYS VOICE: CPT | Mod: PN

## 2022-11-03 NOTE — PROGRESS NOTES
See initial evaluation in Plan of Care.    SVETLANA Santiago, CCC-SLP, IS  Speech-Language Pathology  11/3/2022

## 2022-11-03 NOTE — PLAN OF CARE
[facility]  Speech Therapy Evaluation -Voice    Date: 11/3/2022     Name: Daina Britt   MRN: [id number]    Therapy Diagnosis:   Encounter Diagnosis   Name Primary?    Vocal cord bowing     Physician: Dedrick Wade,*  Physician Orders: Ambulatory referral/consult to Speech Therapy  Medical Diagnosis: Vocal cord bowing [J38.3]    Visit # / Visits Authorized:  1 / 1   Date of Evaluation:  11/3/2022   Insurance Authorization Period: 8/24/2022 - 12/31/2022  Plan of Care Certification:    11/3/2022 to 12/15/2022      Time In: 1030   Time Out: 1110  Procedure Min.   Qualitative Analysis of Voice and Resonance  30   Speech Language Voice Therapy  10     Precautions: Standard  Subjective   Date of Onset: 1.5 - 2 years  History of Current Condition:   Patient reports difficulty with ability to achieve upper registers in pitch while singing. Patient reports vocal hoarseness across the day with no trigger. Patient reports throat clearing across the day. Patient reports taking Omeprazole to manage GERD 2x daily.    Past Medical History: Daina Britt  has a past medical history of Cellulitis of left ankle (1998), Chronic pain (1990), Chronic pain, Colon cancer (2016), Colon polyp, Concussion (1975), Diabetes (2010), Foot infection (1974), GERD (gastroesophageal reflux disease) (1992), Guillain Barré syndrome (1990), Hepatitis A (1995), Osteoarthritis (1990), Reflex sympathetic dystrophy (1997), and RSD (reflex sympathetic dystrophy) (1992).  Daina Britt  has a past surgical history that includes Leg Surgery (Bilateral, 1976); Leg Surgery (Bilateral, 1977); Hand surgery (Left, 1978); Cervical fusion (1985); Surgical removal of bone spur (Left, 1992); Payne calcaneal osteotomy (1992); Lumbar sympathetic nerve block (1997, 1998, 1999, 2000, 2021); Ankle Fusion (Left, 2011); Repair of meniscus of knee (Right, 1998); Ankle surgery (Left, 1998); Cholecystectomy (2013); Tendon repair (Right, 2019);  Dilation and curettage of uterus (1981); Ankle surgery (Left, 1992); Colonoscopy; and Upper gastrointestinal endoscopy.  Medical Hx and Allergies: Daina has a current medication list which includes the following prescription(s): aspirin, atorvastatin, carboxymethylcellulose, advil dual action, losartan, mag/aluminum/sod bicarb/alginc, metformin, omeprazole, and rosuvastatin.   Review of patient's allergies indicates:   Allergen Reactions    Iodinated contrast media Other (See Comments)     Myelogram dye-causes seizures    Titanium analogues Swelling     Titanium screws     Cortisone Other (See Comments)     Cortisone injection  (RSDS)(CRPS)    Codeine Nausea And Vomiting and Rash    Darvon [propoxyphene] Nausea And Vomiting and Rash    Demerol [meperidine] Nausea And Vomiting and Rash    Opioids - morphine analogues Nausea And Vomiting and Rash    Paragoric Nausea And Vomiting and Rash    Wygesic Nausea And Vomiting and Rash     ENT and FNE: Laryngeal endoscopy findings per YEN Phan on 8/24/2022:  Laryngoscopy     Date/Time: 8/24/2022 8:45 AM  Performed by: Dedrick Wade PA-C  Authorized by: Dedrick Wade PA-C      Consent Done?:  Yes (Verbal)  Anesthesia:     Local anesthetic:  Lidocaine 4% and Erwin-Synephrine 1/2%    Patient tolerance:  Patient tolerated the procedure well with no immediate complications    Decongestion performed?: Yes    Laryngoscopy:     Areas examined:  Nasopharynx, oropharynx, hypopharynx, larynx and vocal cords    Laryngoscope size: disposable flexible ambu scope.  Nose External:      No external nasal deformity  Nasopharynx:      No mucosa lesions     Eustachian tube patent  Larynx/hypopharynx:      No epiglottis lesions     No epiglottis edema     No AE folds lesions     No vocal cord polyps     No hypopharynx lesions     No piriform sinus pooling     No piriform sinus lesions     Post cricoid edema     Post cricoid erythema       Good abduction of vocal cords  with breathing and adduction with phonation. No pareses or paralysis of vocal cords. Mild bilateral vocal cord atrophy with vocal cord bowing.        Past Medical History:   Past Medical History:   Diagnosis Date    Cellulitis of left ankle 1998    Chronic pain 1990    Chronic pain     Colon cancer 2016    Colon polyp     Concussion 1975    Diabetes 2010    Foot infection 1974    right    GERD (gastroesophageal reflux disease) 1992    Guillain Barré syndrome 1990    Hepatitis A 1995    Osteoarthritis 1990    Reflex sympathetic dystrophy 1997    RSD (reflex sympathetic dystrophy) 1992    Daina Britt  has a past surgical history that includes Leg Surgery (Bilateral, 1976); Leg Surgery (Bilateral, 1977); Hand surgery (Left, 1978); Cervical fusion (1985); Surgical removal of bone spur (Left, 1992); Payne calcaneal osteotomy (1992); Lumbar sympathetic nerve block (1997, 1998, 1999, 2000, 2021); Ankle Fusion (Left, 2011); Repair of meniscus of knee (Right, 1998); Ankle surgery (Left, 1998); Cholecystectomy (2013); Tendon repair (Right, 2019); Dilation and curettage of uterus (1981); Ankle surgery (Left, 1992); Colonoscopy; and Upper gastrointestinal endoscopy.  Medical Hx and Allergies: Daina has a current medication list which includes the following prescription(s): aspirin, atorvastatin, carboxymethylcellulose, advil dual action, losartan, mag/aluminum/sod bicarb/alginc, metformin, omeprazole, and rosuvastatin.   Review of patient's allergies indicates:   Allergen Reactions    Iodinated contrast media Other (See Comments)     Myelogram dye-causes seizures    Titanium analogues Swelling     Titanium screws     Cortisone Other (See Comments)     Cortisone injection  (RSDS)(CRPS)    Codeine Nausea And Vomiting and Rash    Darvon [propoxyphene] Nausea And Vomiting and Rash    Demerol [meperidine] Nausea And Vomiting and Rash    Opioids - morphine analogues Nausea And Vomiting and Rash    Paragoric Nausea And Vomiting  and Rash    Wygesic Nausea And Vomiting and Rash     Prior Level of Function: vocal quality Within Normal Limits    Current Level of Function:  difficulty with upper register while singing, vocal hoarseness  Prior Therapy:  n/a  Vocal Tract Discomfort:   0/10  Pain Location / Description: no pain indicated across session  Nutrition/Reflux:  full oral - regular/thin liquids  Respiratory: room air  Hearing: perceived Within Functional Limits   Social Voice Use:  minimal  Work Voice Use: continual at work 5 days/week  Vocal Hygiene: etoh - no, smoking - no, water - 64oz/day, sleep - varies  Work/Life Stress: 3/10  Patient Therapy Goals:  to improve my voice     Objective   Formal Assessment:  Swallowing: full oral - regular/thin liquids   Breathing: throat clearing    Posture: Within Normal Limits   GRBAS: [id number]    Perceptual assessment:    -Quality: rough, strained, and pitch breaks at upper register  -Volume: appropriate for age and gender  -Pitch: low F0  -Flexibility: instability    Habitual respiratory pattern: diaphragmatic.  CAPE-V Overall Score: 15/100  MPT (ah > 18s WFL): 15.99 seconds  S/Z ratio (ratio of 1.4+ may indicate a degree of vocal fold dysfunction): 0.974    VHI-10 (completed to assess self-perceived handicap associated with dysphonia; >11 considered abnormal): 10 indicating MILD severity   RSI (completed to assess the possible presence and/or severity of LPR symptoms and any relationship between this condition and the pt's dysphagia; score of ~15 may indicate LPR): 24    Acoustic Results:  Mean volume during sustained phonation: 61 dB (average is 60 dB in conversation taken at a distance of 50cm)  Mean volume in conversation: 57 dB (average is 60 dB in conversation taken at a distance of 50cm)     Treatment   Total Treatment Time Separate from Evaluation:  10 minutes   Patient educated regarding vocal abuse behaviors (I.e. throat clearing), speech therapy role/Plan of Care, reasoninf for each  stimulability task completed.     Stimulability  Diaphragmatic Breathing: Pt completed diaphragmatic breathing x5 independently. No changes in vocal quality.  SOVT: Pt completed semi occluded voice exercise (straw phonation with cup bubbles) x5 independently. Patient noted with no improvement in vocal quality following task.  Pitch glide: Pt completed pitch glides x5 independently. Patient noted with no improvement in vocal quality following task.      Education: Plan of Care, role of SLP in care, vocal hygeine, course of medical disease affect on therapy diagnosis , scheduling/ cancellation policy, and insurance limitations / visit limit  were discussed with patient. Patient expressed understanding.     Home Program: yes - patient asked to complete pitch glides as completed today in sets of 10, 2-3x daily  Assessment     Daina presents to [facility] status post medical diagnosis of vocal cord bowing. She presents with MILD dysphonia characterized by mild strain and rough quality and phonatory breaks at upper register.   Demonstrates impairments including limitations as described in the problem list. Positive prognostic factors include great motivation and understanding. Negative prognostic factors include n/a. No barriers to therapy identified. Patient will benefit from skilled, outpatient neurological rehabilitation speech therapy.    Rehab Potential: good  Patient's spiritual, cultural, and educational needs considered and patient agreeable to plan of care and goals.    Short Term Goals (4 weeks):   Short Term Goals:  1. Pt will complete neck relaxation exercises 10x each per session/at home ind'ly.    2. Patient will complete SOVT exercises and/or resonant-focused exercises with min A.  3. Patient will improve the quality, efficiency, and ease of phonation through resonant and/or airflow exercises from the syllable to conversation level with 80% accuracy.  4. Patient will increase awareness  for voice conservations behaviors by following the 50/10 rule and reduce voice use in competing noise environments.   5. Patient will demonstrate the ability to increase awareness of voicing behavior through self-monitoring to facilitate generalization in functional speaking situations with 80% accuracy.       6. Pt will reduce/eliminate/substitute throat clearing ind'ly.        Long Term Goals (6 weeks):   Patient will implement and adhere to vocal hygiene protocols on a daily basis, including the elimination of phonotraumatic behaviors.  Patient and clinician will facilitate changes in vocal function in order to restore functional use of voice for daily occupational, social, and emotional demands.  Pt will demonstrate improved vocal quality/loudness/intonantion for sustained vocalization/speech at the word/phrase/sentence/conversational level to communicate basic medical and social needs in functional living environment.     Plan     Plan of Care Certification Period: 11/3/2022 to 12/15/2022    Recommended Treatment Plan:  Patient will participate in the [facility] rehabilitation program for speech therapy 1 times per week for 6 weeks to address her Communication deficits, to educate patient and their family, and to participate in a home exercise program.    Other Recommendations: n/a    Therapist's Name:   Astrid Chu CCC-SLP   11/3/2022     I CERTIFY THE NEED FOR THESE SERVICES FURNISHED UNDER THIS PLAN OF TREATMENT AND WHILE UNDER MY CARE    Physician Name: _______________________________    Physician Signature: ____________________________

## 2022-11-14 NOTE — PROGRESS NOTES
[facility]  Speech Therapy Treatment Note- Voice  Date: 11/16/2022     Name: Daina Britt   MRN: [id number]   Therapy Diagnosis:   Encounter Diagnoses   Name Primary?    Vocal cord bowing Yes    Dysphonia     Globus pharyngeus    Physician: Dedrick Wade,*  Physician Orders: Speech Pathology  Medical Diagnosis: Vocal cord bowing [J38.3]    Visit #/ Visits Authorized: 1/ 12  Date of Evaluation:  11/3/2022  Insurance Authorization Period: 11/16/2022 - 12/31/2022  Plan of Care Expiration Date:    due 12/15/2022  Extended Plan of Care:  n/a   Progress Note: due 12/1/2022   Visits Cancelled: 0  Visits No Show: 0    Time In:  0730  Time Out:  0810  Total Billable Time: 40 minutes     Precautions: Standard  Subjective:   Patient reports: increased coughing and throat clearing past couple days. Patient reports increased mucus production. Patient continues completion of exercises 2-3x daily. Patient reports 28-40oz/day.   She was compliant to home exercise program.   Response to previous treatment: good   Pain Scale:  0/10 on a Visual Analog Scale currently.   Pain Location: no pain indicated across session  Objective:   UNTIMED  Procedure Min.   Speech- Language- Voice Therapy  40       Total Untimed Units: 1  Charges Billed/Number of units: 1    Short Term Goals: (4 weeks) Current Progress:   1. Pt will complete neck relaxation exercises 10x each per session/at home ind'ly.      Progressing/ Not Met 11/16/2022   -Not addressed this session.    2. Patient will complete SOVT exercises and/or resonant-focused exercises with min A.     Progressing/ Not Met 11/16/2022   Patient completed straw phonation with cup bubbles x20 independently     Patient education and demonstrated understanding of pitch glides with use of straw phonation x20 independently     3. Patient will improve the quality, efficiency, and ease of phonation through resonant and/or airflow exercises from the syllable to conversation  level with 80% accuracy.     Progressing/ Not Met 11/16/2022   See education below     4. Patient will increase awareness for voice conservations behaviors by following the 50/10 rule and reduce voice use in competing noise environments.      Progressing/ Not Met 11/16/2022   See education below     5. Patient will demonstrate the ability to increase awareness of voicing behavior through self-monitoring to facilitate generalization in functional speaking situations with 80% accuracy.          Progressing/ Not Met 11/16/2022   -Not addressed this session.      6. Pt will reduce/eliminate/substitute throat clearing ind'ly.      Progressing/ Not Met 11/16/2022   -Not addressed this session.        Patient Education/Response:   Patient educated regarding the following in today's session:   SOVT exercises  Continued vocal hygiene   Patient with continued education regarding throat clearing/coughing as abuse  Upward-forward resonance    Home program established: Patient instructed to continue prior program  Exercises were reviewed and Daina was able to demonstrate them prior to the end of the session.  Daina demonstrated good  understanding of the education provided.     See Electronic Medical Record under Patient Instructions for exercises provided throughout therapy.  Assessment:   Daina is progressing well towards her goals. Patient completed SOVT exercises today - patient noted with immediate improvement of vocal quality with exercises completed today. Patient educated regarding continued use of exercises 2-3 daily to improve carryover. Patient in agreement and voiced understanding. Current goals remain appropriate. Goals to be updated as necessary.     Patient prognosis is Good. Patient will continue to benefit from skilled outpatient speech and language therapy to address the deficits listed in the problem list on initial evaluation, provide patient/family education and to maximize patient's level of independence  in the home and community environment.   Medical necessity is demonstrated by the following IMPAIRMENTS:  Patient with decreased vocal intensity resulting in severely decreased speech intelligibility. 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  Plan:   Continue Plan of Care with focus on vocal function    Astrid Chu CCC-SLP   11/16/2022

## 2022-11-16 ENCOUNTER — CLINICAL SUPPORT (OUTPATIENT)
Dept: REHABILITATION | Facility: HOSPITAL | Age: 62
End: 2022-11-16
Payer: COMMERCIAL

## 2022-11-16 DIAGNOSIS — J38.3 VOCAL CORD BOWING: Primary | ICD-10-CM

## 2022-11-16 DIAGNOSIS — R09.A2 GLOBUS PHARYNGEUS: ICD-10-CM

## 2022-11-16 DIAGNOSIS — R49.0 DYSPHONIA: ICD-10-CM

## 2022-11-16 PROCEDURE — 92507 TX SP LANG VOICE COMM INDIV: CPT | Mod: PN

## 2022-12-01 NOTE — PROGRESS NOTES
"[facility]  Speech Therapy Treatment Note- Voice  PROGRESS NOTE  Date: 12/2/2022     Name: Daina Britt   MRN: [id number]   Therapy Diagnosis:   Encounter Diagnosis   Name Primary?    Dysphonia Yes     Physician: Dedrick Wade,*  Physician Orders: Speech Pathology  Medical Diagnosis: Vocal cord bowing [J38.3]    Visit #/ Visits Authorized: 2/ 12  Date of Evaluation:  11/3/2022  Insurance Authorization Period: 11/16/2022 - 12/31/2022  Plan of Care Expiration Date:    due 12/15/2022  Extended Plan of Care:  n/a   Progress Note: due 12/1/2022 - this note   Visits Cancelled: 0  Visits No Show: 0    Time In:  0900  Time Out:  0940  Total Billable Time: 40 minutes     Precautions: Standard  Subjective:   Patient reports: increased coughing and throat clearing past couple days. Patient reports increased mucus production. Patient continues completion of exercises 2-3x daily. Patient reports 28-40oz/day.   She was compliant to home exercise program.   Response to previous treatment: good   Pain Scale:  0/10 on a Visual Analog Scale currently.   Pain Location: no pain indicated across session  Objective:   UNTIMED  Procedure Min.   Speech- Language- Voice Therapy  45       Total Untimed Units: 1  Charges Billed/Number of units: 1    Short Term Goals: (4 weeks) Current Progress:   1. Pt will complete neck relaxation exercises 10x each per session/at home ind'ly.      Progressing/ Not Met 12/2/2022   -Not addressed this session.    2. Patient will complete SOVT exercises and/or resonant-focused exercises with min A.     Progressing/ Not Met 12/2/2022   Patient completed pitch glides x10 independently. Patient noted with improved vocal quality as task progressed    Patient completed straw phonation with cup bubbles humming happy birthday x3 independently     Patient completed "buzz a sentence" with use of cup bubbles x10 independently     Following cup bubbles, patient completed pitch glides x10 " independently - patient noted with continued improvement with vocal    3. Patient will improve the quality, efficiency, and ease of phonation through resonant and/or airflow exercises from the syllable to conversation level with 80% accuracy.     Progressing/ Not Met 12/2/2022   Patient completed education and demonstrated education regarding vocal function exercises.     - Warmup: completed x10 independently  -stretching and cl: x5 independently   - power: x2 independently   -cool down:    4. Patient will increase awareness for voice conservations behaviors by following the 50/10 rule and reduce voice use in competing noise environments.      Progressing/ Not Met 12/2/2022   See education below     5. Patient will demonstrate the ability to increase awareness of voicing behavior through self-monitoring to facilitate generalization in functional speaking situations with 80% accuracy.          Progressing/ Not Met 12/2/2022   -Not addressed this session.      6. Pt will reduce/eliminate/substitute throat clearing ind'ly.      Progressing/ Not Met 12/2/2022   -Not addressed this session.        Patient Education/Response:   Patient educated regarding the following in today's session:   SOVT exercises reinforced - handout provided.   Introduction to vocal function exercises - handout provided  Speech therapy Plan of Care     Home program established: Patient instructed to continue prior program  Exercises were reviewed and Daina was able to demonstrate them prior to the end of the session.  Daina demonstrated good  understanding of the education provided.     See Electronic Medical Record under Patient Instructions for exercises provided throughout therapy.  Assessment:   PROGRESS NOTE: Daina is progressing well towards her goals. Patient completed SOVT and vocal function exercises today - patient noted with immediate improvement of vocal quality with exercises completed today. Patient educated regarding  continued use of exercises 2-3 daily to improve carryover. Patient in agreement and voiced understanding. Current goals remain appropriate. Goals to be updated as necessary.     Patient prognosis is Good. Patient will continue to benefit from skilled outpatient speech and language therapy to address the deficits listed in the problem list on initial evaluation, provide patient/family education and to maximize patient's level of independence in the home and community environment.   Medical necessity is demonstrated by the following IMPAIRMENTS:  Patient with decreased vocal intensity resulting in severely decreased speech intelligibility. 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  Plan:   Continue Plan of Care with focus on vocal function    Astrid Chu CCC-SLP   12/2/2022

## 2022-12-02 ENCOUNTER — CLINICAL SUPPORT (OUTPATIENT)
Dept: REHABILITATION | Facility: HOSPITAL | Age: 62
End: 2022-12-02
Payer: COMMERCIAL

## 2022-12-02 DIAGNOSIS — R49.0 DYSPHONIA: Primary | ICD-10-CM

## 2022-12-02 PROCEDURE — 92507 TX SP LANG VOICE COMM INDIV: CPT | Mod: PN

## 2022-12-02 NOTE — PATIENT INSTRUCTIONS
Vocal Function Exercises   Robbie Saldaña, Ph.D.  Goal: To balance breathing and voice as a way to increase vocal flexibility and strengthen laryngeal musculature. Perform these exercises at least 2x each. Repeat 2x per day.   Directions:   Warm-Up: Sustain eeeee at a comfortable pitch as long as you are able to.     Stretching and Edgardo: Glide your voice from your lowest pitch to your highest and then back down to the lowest pitch on the sound ooooo.     Power: Sustain oooo on five consecutive notes on the musical scale as long as possible without straining. Take a breath and pause between each note.     Cool-Down: Glide from the highest note to the lowest on the sound ing.     **Remember to relax, keeping good posture while completing the exercises  **Remember to keep the sound in your mouth and not the throat.       Dolores's Semi-Occluded Vocal Tract Exercises   Eze Bernal, Ph.D.  Goal: To use appropriate tone, focus, and breath support without tensing the muscles of the mouth and throat or hurting the vocal folds which is achieved by creating a small mouth opening while producing voice. With an open mouth, there is a high vibrational amplitude of the VFs, but with a semi-occluded mouth, there is roughly half the vibrational amplitude of the VFs.    Small mouth openings with greatest effect/most artificialàleast effect/most natural:   Highly resistance stirring straw (coffee stirrer)   Less resistant drinking straw  Bilateral or labiodental voiced fricative (lip buzz or vvv sound)  Lip or tongue trill   Nasal consonants   Vowels eee and ooo    Exercises:   Sustained pitch. Hold a comfortable pitch as long as you are able to.     Pitch glides. Glide from lowest to highest pitch and back down to the lowest pitch.     Buzz a song. Hum a song through your small mouth opening (#1-6 listed above), such as Happy Birthday or Darcy Had a Little Hicks.     Buzz a sentence. Hum a sentence through you  small mouth opening (#1-6 listed above), such Hi, my name is ____ or How are you doing?    **Relax, breathe easily in through your nose  **Focus on the vibrations in your lips, nose, and the front of your face

## 2022-12-06 NOTE — PROGRESS NOTES
[facility]  Speech Therapy Treatment Note- Voice    Date: 12/8/2022     Name: Daina Britt   MRN: [id number]   Therapy Diagnosis:   Encounter Diagnosis   Name Primary?    Dysphonia Yes       Physician: Dedrick Wade,*  Physician Orders: Speech Pathology  Medical Diagnosis: Vocal cord bowing [J38.3]    Visit #/ Visits Authorized: 3/ 12  Date of Evaluation:  11/3/2022  Insurance Authorization Period: 11/16/2022 - 12/31/2022  Plan of Care Expiration Date:    due 12/15/2022  Extended Plan of Care:  n/a   Progress Note: due 12/29/2022  Visits Cancelled: 0  Visits No Show: 0    Time In:  1355  Time Out:  1430  Total Billable Time: 35 minutes     Precautions: Standard  Subjective:   Patient reports: improved vocal quality with singing following vocal function exercises. Patient reports to complete x2 daily.   She was compliant to home exercise program.   Response to previous treatment: good   Pain Scale:  0/10 on a Visual Analog Scale currently.   Pain Location: no pain indicated across session  Objective:   UNTIMED  Procedure Min.   Speech- Language- Voice Therapy  35       Total Untimed Units: 1  Charges Billed/Number of units: 1    Short Term Goals: (4 weeks) Current Progress:   1. Pt will complete neck relaxation exercises 10x each per session/at home ind'ly.      Progressing/ Not Met 12/8/2022   -Not addressed this session.    2. Patient will complete SOVT exercises and/or resonant-focused exercises with min A.     Progressing/ Not Met 12/8/2022   -Not addressed this session.    3. Patient will improve the quality, efficiency, and ease of phonation through resonant and/or airflow exercises from the syllable to conversation level with 80% accuracy.     Progressing/ Not Met 12/8/2022   Patient completed education and demonstrated education regarding vocal function exercises.     - Warmup: completed x10 independently  -stretching and cl: x10 independently   - power: x3 rounds of 5  notes independently   -cool down: completed x10   4. Patient will increase awareness for voice conservations behaviors by following the 50/10 rule and reduce voice use in competing noise environments.      Progressing/ Not Met 12/8/2022   See education below     5. Patient will demonstrate the ability to increase awareness of voicing behavior through self-monitoring to facilitate generalization in functional speaking situations with 80% accuracy.          Progressing/ Not Met 12/8/2022   -Not addressed this session.      6. Pt will reduce/eliminate/substitute throat clearing ind'ly.      Progressing/ Not Met 12/8/2022   Completed further education today to ensure understanding        Patient Education/Response:   Patient educated regarding the following in today's session:   SOVT exercises reinforced  continue vocal function exercises  Speech therapy Plan of Care   Throat clearing strategies     Home program established: Patient instructed to continue prior program  Exercises were reviewed and Daina was able to demonstrate them prior to the end of the session.  Daina demonstrated good  understanding of the education provided.     See Electronic Medical Record under Patient Instructions for exercises provided throughout therapy.  Assessment:   Daina is progressing well towards her goals. Patient completed vocal function exercises today - patient noted with immediate improvement of vocal quality with exercises completed today. Patient educated regarding continued use of exercises 2-3 daily to improve carryover. Patient in agreement and voiced understanding. Current goals remain appropriate. Goals to be updated as necessary.     Patient prognosis is Good. Patient will continue to benefit from skilled outpatient speech and language therapy to address the deficits listed in the problem list on initial evaluation, provide patient/family education and to maximize patient's level of independence in the home and community  environment.   Medical necessity is demonstrated by the following IMPAIRMENTS:  Patient with decreased vocal intensity resulting in severely decreased speech intelligibility. 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  Plan:   Continue Plan of Care with focus on vocal function    Astrid Chu CCC-SLP   12/8/2022

## 2022-12-08 ENCOUNTER — CLINICAL SUPPORT (OUTPATIENT)
Dept: REHABILITATION | Facility: HOSPITAL | Age: 62
End: 2022-12-08
Payer: COMMERCIAL

## 2022-12-08 DIAGNOSIS — R49.0 DYSPHONIA: Primary | ICD-10-CM

## 2022-12-08 PROCEDURE — 92507 TX SP LANG VOICE COMM INDIV: CPT | Mod: PN

## 2022-12-13 NOTE — PROGRESS NOTES
[facility]  Speech Therapy Treatment Note- Voice  DISCHARGE SUMMARY    Date: 12/14/2022     Name: Daina Britt   MRN: [id number]   Therapy Diagnosis:   Encounter Diagnoses   Name Primary?    Dysphonia Yes    Vocal cord bowing     Globus pharyngeus        Physician: Dedrick Wade,*  Physician Orders: Speech Pathology  Medical Diagnosis: Vocal cord bowing [J38.3]    Visit #/ Visits Authorized: 4/ 12  Date of Evaluation:  11/3/2022  Insurance Authorization Period: 11/16/2022 - 12/31/2022  Plan of Care Expiration Date:    due 12/15/2022  Extended Plan of Care:  n/a   Progress Note: due 12/29/2022  Visits Cancelled: 0  Visits No Show: 0    Time In:  1430  Time Out:  1455  Total Billable Time: 25 minutes     Precautions: Standard  Subjective:   Patient reports: improved vocal quality with singing following vocal function exercises. Patient reports to complete x2 daily. Patient reports continued improvement and is very happy with progress and where she is at in regards to vocal quality  She was compliant to home exercise program.   Response to previous treatment: good   Pain Scale:  0/10 on a Visual Analog Scale currently.   Pain Location: no pain indicated across session  Objective:   UNTIMED  Procedure Min.   Speech- Language- Voice Therapy  25       Total Untimed Units: 1  Charges Billed/Number of units: 1    Short Term Goals: (4 weeks) Current Progress:   1. Pt will complete neck relaxation exercises 10x each per session/at home ind'ly.      Progressing/ Not Met 12/14/2022   -Not addressed this session.    2. Patient will complete SOVT exercises and/or resonant-focused exercises with min A.     Progressing/ Not Met 12/14/2022   -Not addressed this session.    3. Patient will improve the quality, efficiency, and ease of phonation through resonant and/or airflow exercises from the syllable to conversation level with 80% accuracy.     Progressing/ Not Met 12/14/2022   See education below     4.  Patient will increase awareness for voice conservations behaviors by following the 50/10 rule and reduce voice use in competing noise environments.      Progressing/ Not Met 12/14/2022   See education below     5. Patient will demonstrate the ability to increase awareness of voicing behavior through self-monitoring to facilitate generalization in functional speaking situations with 80% accuracy.          Progressing/ Not Met 12/14/2022   -Not addressed this session.      6. Pt will reduce/eliminate/substitute throat clearing ind'ly.      Progressing/ Not Met 12/14/2022   No throat clearing/cough noted across session today. Patient reports reduced throat clearing at home noted.         Patient Education/Response:   Patient educated regarding the following in today's session:   SOVT exercises reinforced  continue vocal function exercises  Speech therapy Plan of Care to be reinstated if needed.   Throat clearing strategies     Home program established: Patient instructed to continue prior program  Exercises were reviewed and Daina was able to demonstrate them prior to the end of the session.  Daina demonstrated good  understanding of the education provided.     See Electronic Medical Record under Patient Instructions for exercises provided throughout therapy.  Assessment:   Daina has progressed well towards her goals. Patient educated regarding continued use of exercises 2-3 daily to improve carryover, initiation of reducing vocal function exercises, discharge from speech therapy at this time given reached maximum rehab potential. Patient in agreement and voiced understanding. No further speech therapy indicated at this time.     Patient prognosis is Good.   Medical necessity is demonstrated by the following IMPAIRMENTS:  Patient with decreased vocal intensity resulting in severely decreased speech intelligibility. Reportedly, this worsens over the phone negatively impacting his ability to effectively and  efficiently explain an emergency situation to emergency operators via phone or in person    Barriers to Therapy: none  Patient's spiritual, cultural and educational needs considered and patient agreeable to plan of care and goals.  Plan:   No further speech therapy indicated at this time.     Astrid Chu CCC-SLP   12/14/2022

## 2022-12-14 ENCOUNTER — CLINICAL SUPPORT (OUTPATIENT)
Dept: REHABILITATION | Facility: HOSPITAL | Age: 62
End: 2022-12-14
Payer: COMMERCIAL

## 2022-12-14 DIAGNOSIS — R09.A2 GLOBUS PHARYNGEUS: ICD-10-CM

## 2022-12-14 DIAGNOSIS — J38.3 VOCAL CORD BOWING: ICD-10-CM

## 2022-12-14 DIAGNOSIS — R49.0 DYSPHONIA: Primary | ICD-10-CM

## 2022-12-14 PROCEDURE — 92507 TX SP LANG VOICE COMM INDIV: CPT | Mod: PN

## 2022-12-14 NOTE — PLAN OF CARE
Outpatient Therapy Discharge Summary   Discharge Date: 12/14/2022   Name: Daina Britt  Clinic Number: [id number]  Therapy Diagnosis:   Encounter Diagnoses   Name Primary?    Dysphonia Yes    Vocal cord bowing     Globus pharyngeus      Physician: Dedrick Wade,*  Physician Orders: Speech Pathology  Medical Diagnosis: Vocal cord bowing [J38.3]  Evaluation Date: 11/3/2022    Date of Last visit: 12/14/2022  Total Visits Received: 5  Cancelled Visits: 0  No Show Visits: 0    Assessment    Assessment of Current Status: Daina has progressed well towards her goals. Patient educated regarding continued use of exercises 2-3 daily to improve carryover, initiation of reducing vocal function exercises, discharge from speech therapy at this time given reached maximum rehab potential. Patient in agreement and voiced understanding. No further speech therapy indicated at this time.      Goals:   Short Term Goals: (4 weeks) Current Progress:   1. Pt will complete neck relaxation exercises 10x each per session/at home ind'ly.     Goal not met - education completed across sessions   2. Patient will complete SOVT exercises and/or resonant-focused exercises with min A.         Goal not met - education completed and patient demonstrated independent understanding across sessions   3. Patient will improve the quality, efficiency, and ease of phonation through resonant and/or airflow exercises from the syllable to conversation level with 80% accuracy.       Goal not met - education completed and patient demonstrated independent understanding across sessions      4. Patient will increase awareness for voice conservations behaviors by following the 50/10 rule and reduce voice use in competing noise environments.     Goal not met - education completed and patient demonstrated independent understanding across sessions   5. Patient will demonstrate the ability to increase awareness of voicing behavior through self-monitoring to  facilitate generalization in functional speaking situations with 80% accuracy.          Goal not met - education completed and patient demonstrated independent understanding across sessions      6. Pt will reduce/eliminate/substitute throat clearing ind'ly.        Goal not met - education completed and patient demonstrated independent understanding across sessions       Long Term Goals (6 weeks):   Patient will implement and adhere to vocal hygiene protocols on a daily basis, including the elimination of phonotraumatic behaviors. - GOAL MET  Patient and clinician will facilitate changes in vocal function in order to restore functional use of voice for daily occupational, social, and emotional demands. - GOAL MET  Pt will demonstrate improved vocal quality/loudness/intonantion for sustained vocalization/speech at the word/phrase/sentence/conversational level to communicate basic medical and social needs in functional living environment. - GOAL MET    Discharge reason: Patient has reached the maximum rehab potential for the present time    Plan   This patient is discharged from Speech Therapy    Astrid Chu CCC-SLP   12/14/2022

## 2022-12-22 ENCOUNTER — ANESTHESIA EVENT (OUTPATIENT)
Dept: ENDOSCOPY | Facility: HOSPITAL | Age: 62
End: 2022-12-22
Payer: COMMERCIAL

## 2022-12-22 ENCOUNTER — HOSPITAL ENCOUNTER (OUTPATIENT)
Facility: HOSPITAL | Age: 62
Discharge: HOME | End: 2022-12-22
Attending: INTERNAL MEDICINE | Admitting: INTERNAL MEDICINE
Payer: COMMERCIAL

## 2022-12-22 ENCOUNTER — ANESTHESIA (OUTPATIENT)
Dept: ENDOSCOPY | Facility: HOSPITAL | Age: 62
End: 2022-12-22
Payer: COMMERCIAL

## 2022-12-22 VITALS
HEIGHT: 66 IN | TEMPERATURE: 98 F | SYSTOLIC BLOOD PRESSURE: 161 MMHG | DIASTOLIC BLOOD PRESSURE: 77 MMHG | BODY MASS INDEX: 27.32 KG/M2 | WEIGHT: 170 LBS | HEART RATE: 65 BPM | OXYGEN SATURATION: 99 % | RESPIRATION RATE: 16 BRPM

## 2022-12-22 DIAGNOSIS — R13.10 DYSPHAGIA: ICD-10-CM

## 2022-12-22 PROCEDURE — 27201012 HC FORCEPS, HOT/COLD, DISP: Performed by: INTERNAL MEDICINE

## 2022-12-22 PROCEDURE — D9220A PRA ANESTHESIA: Mod: ANES,,, | Performed by: ANESTHESIOLOGY

## 2022-12-22 PROCEDURE — 63600175 PHARM REV CODE 636 W HCPCS: Performed by: NURSE ANESTHETIST, CERTIFIED REGISTERED

## 2022-12-22 PROCEDURE — D9220A PRA ANESTHESIA: Mod: CRNA,,, | Performed by: NURSE ANESTHETIST, CERTIFIED REGISTERED

## 2022-12-22 PROCEDURE — 43239 EGD BIOPSY SINGLE/MULTIPLE: CPT | Performed by: INTERNAL MEDICINE

## 2022-12-22 PROCEDURE — D9220A PRA ANESTHESIA: ICD-10-PCS | Mod: ANES,,, | Performed by: ANESTHESIOLOGY

## 2022-12-22 PROCEDURE — 25000003 PHARM REV CODE 250: Performed by: NURSE ANESTHETIST, CERTIFIED REGISTERED

## 2022-12-22 PROCEDURE — 37000008 HC ANESTHESIA 1ST 15 MINUTES: Performed by: INTERNAL MEDICINE

## 2022-12-22 PROCEDURE — 25000003 PHARM REV CODE 250: Performed by: INTERNAL MEDICINE

## 2022-12-22 PROCEDURE — 88305 TISSUE EXAM BY PATHOLOGIST: CPT | Mod: 26,,, | Performed by: PATHOLOGY

## 2022-12-22 PROCEDURE — 88305 TISSUE EXAM BY PATHOLOGIST: ICD-10-PCS | Mod: 26,,, | Performed by: PATHOLOGY

## 2022-12-22 PROCEDURE — 88305 TISSUE EXAM BY PATHOLOGIST: CPT | Performed by: PATHOLOGY

## 2022-12-22 PROCEDURE — 43239 PR EGD, FLEX, W/BIOPSY, SGL/MULTI: ICD-10-PCS | Mod: ,,, | Performed by: INTERNAL MEDICINE

## 2022-12-22 PROCEDURE — 43239 EGD BIOPSY SINGLE/MULTIPLE: CPT | Mod: ,,, | Performed by: INTERNAL MEDICINE

## 2022-12-22 PROCEDURE — D9220A PRA ANESTHESIA: ICD-10-PCS | Mod: CRNA,,, | Performed by: NURSE ANESTHETIST, CERTIFIED REGISTERED

## 2022-12-22 RX ORDER — SODIUM CHLORIDE 9 MG/ML
INJECTION, SOLUTION INTRAVENOUS CONTINUOUS
Status: DISCONTINUED | OUTPATIENT
Start: 2022-12-22 | End: 2022-12-22 | Stop reason: HOSPADM

## 2022-12-22 RX ORDER — LIDOCAINE HCL/PF 100 MG/5ML
SYRINGE (ML) INTRAVENOUS
Status: DISCONTINUED | OUTPATIENT
Start: 2022-12-22 | End: 2022-12-22

## 2022-12-22 RX ORDER — PROPOFOL 10 MG/ML
VIAL (ML) INTRAVENOUS
Status: DISCONTINUED | OUTPATIENT
Start: 2022-12-22 | End: 2022-12-22

## 2022-12-22 RX ADMIN — PROPOFOL 30 MG: 10 INJECTION, EMULSION INTRAVENOUS at 02:12

## 2022-12-22 RX ADMIN — PROPOFOL 100 MG: 10 INJECTION, EMULSION INTRAVENOUS at 02:12

## 2022-12-22 RX ADMIN — SODIUM CHLORIDE: 9 INJECTION, SOLUTION INTRAVENOUS at 01:12

## 2022-12-22 RX ADMIN — LIDOCAINE HYDROCHLORIDE 100 MG: 20 INJECTION INTRAVENOUS at 02:12

## 2022-12-22 NOTE — TRANSFER OF CARE
"Anesthesia Transfer of Care Note    Patient: Daina Britt    Procedure(s) Performed: Procedure(s) (LRB):  EGD (ESOPHAGOGASTRODUODENOSCOPY) (N/A)    Patient location: PACU    Anesthesia Type: general    Transport from OR: Transported from OR on 2-3 L/min O2 by NC with adequate spontaneous ventilation    Post pain: adequate analgesia    Post assessment: no apparent anesthetic complications    Post vital signs: stable    Level of consciousness: awake    Nausea/Vomiting: no nausea/vomiting    Complications: none    Transfer of care protocol was followed      Last vitals:   Visit Vitals  BP (!) 192/74 (BP Location: Left arm, Patient Position: Lying)   Pulse (!) 48   Temp 36.7 °C (98.1 °F)   Resp 18   Ht 5' 5.5" (1.664 m)   Wt 77.1 kg (170 lb)   SpO2 98%   Breastfeeding No   BMI 27.86 kg/m²     "

## 2022-12-22 NOTE — PLAN OF CARE
Vss, annie po fluids, denies pain, ambulates easily. IV removed, catheter intact. Discharge instructions provided and states understanding. States ready to go home.  Discharged from facility with family per wheelchair.

## 2022-12-22 NOTE — ANESTHESIA PREPROCEDURE EVALUATION
12/22/2022  Daina Britt is a 62 y.o., female.      Patient Active Problem List   Diagnosis    Dysphonia       Past Surgical History:   Procedure Laterality Date    ANKLE FUSION Left 2011    x 3. Previously done in 1997 and 1999.    ANKLE SURGERY Left 1998    screw removal due to allergy to titanium.    ANKLE SURGERY Left 1992    Payne Procedure on left ankle.    CERVICAL FUSION  1985    CHOLECYSTECTOMY  2013    Due to Gallstones.    COLONOSCOPY      DILATION AND CURETTAGE OF UTERUS  1981    Due to miscarriage.    PAYNE CALCANEAL OSTEOTOMY  1992    HAND SURGERY Left 1978    LEG SURGERY Bilateral 1976    LEG SURGERY Bilateral 1977    staple removal    LUMBAR SYMPATHETIC NERVE BLOCK  1997, 1998, 1999, 2000, 2021    multiple    REPAIR OF MENISCUS OF KNEE Right 1998    SURGICAL REMOVAL OF BONE SPUR Left 1992    Ankle.    TENDON REPAIR Right 2019    ankle    UPPER GASTROINTESTINAL ENDOSCOPY          Tobacco Use:  The patient  reports that she has never smoked. She has never used smokeless tobacco.     Results for orders placed or performed during the hospital encounter of 08/12/22   EKG 12-lead    Collection Time: 08/12/22  8:52 AM    Narrative    Test Reason : M79.89,M79.672,    Vent. Rate : 056 BPM     Atrial Rate : 056 BPM     P-R Int : 156 ms          QRS Dur : 100 ms      QT Int : 408 ms       P-R-T Axes : 006 027 080 degrees     QTc Int : 393 ms    Sinus bradycardia  Otherwise normal ECG  No previous ECGs available  Confirmed by Qamar KLEIN, Tomy ([id number]) on 8/13/2022 1:25:24 PM    Referred By:             Confirmed By:Tomy Foote MD             Lab Results   Component Value Date    WBC 5.45 08/12/2022    HGB 13.0 08/12/2022    HCT 40.4 08/12/2022    MCV 89 08/12/2022     08/12/2022     BMP  Lab Results   Component Value Date     08/12/2022    K 4.4 08/12/2022      08/12/2022    CO2 28 08/12/2022    BUN 15 08/12/2022    CREATININE 0.6 08/12/2022    CALCIUM 9.9 08/12/2022    ANIONGAP 7 (L) 08/12/2022     (H) 08/12/2022     (H) 01/20/2022       No results found for this or any previous visit.        Pre-op Assessment    I have reviewed the Patient Summary Reports.     I have reviewed the Nursing Notes. I have reviewed the NPO Status.   I have reviewed the Medications.     Review of Systems  Anesthesia Hx:  No problems with previous Anesthesia  History of prior surgery of interest to airway management or planning: cervical fusion. Denies Family Hx of Anesthesia complications.   Denies Personal Hx of Anesthesia complications.   Social:  No Alcohol Use, Non-Smoker    Hematology/Oncology:  Hematology Normal       -- Cancer in past history:  surgery  Oncology Comments: Colon cancer     EENT/Dental:  EENT/Dental Normal Eyes: Visual Impairment Has Bilateral Catarract    Cardiovascular:   Hypertension, poorly controlled ECG has been reviewed.     Pulmonary:  Pulmonary Normal    Renal/:  Renal/ Normal     Hepatic/GI:   GERD, well controlled Liver Disease, Hepatitis, A dysphagia   Musculoskeletal:   Arthritis  Osteoarthritis  Patient post lumbar sympathetic nerve block Spine Disorders: lumbar and cervical Chronic Pain    Neurological:   Neuromuscular Disease, Guillain Barré syndrome  RSD (reflex sympathetic dystrophy)  Left lower extremity radicular pain  Chronic Pain Syndrome  Peripheral Neuropathy    Endocrine:   Diabetes, well controlled, type 2    Dermatological:  Skin Normal    Psych:  Psychiatric Normal           Physical Exam  General: Well nourished, Cooperative, Alert and Oriented    Airway:  Mallampati: III / II  Mouth Opening: Normal  TM Distance: Normal  Tongue: Normal  Neck ROM: Normal ROM    Dental:  Intact, Caps / Implants    Chest/Lungs:  Clear to auscultation, Normal Respiratory Rate    Heart:  Rate: Normal  Rhythm: Regular Rhythm  Sounds:  Normal        Anesthesia Plan  Type of Anesthesia, risks & benefits discussed:    Anesthesia Type: Gen Natural Airway  Intra-op Monitoring Plan: Standard ASA Monitors  Induction:  IV  Informed Consent: Informed consent signed with the Patient and all parties understand the risks and agree with anesthesia plan.  All questions answered.   ASA Score: 2    Ready For Surgery From Anesthesia Perspective.     .

## 2022-12-22 NOTE — H&P
[facility]    CC: GERD, Dysphagia    HPI 62 y.o. female presents for evaluation of GERD and dysphagia    Past Medical History:   Diagnosis Date    Cellulitis of left ankle 1998    Chronic pain 1990    Chronic pain     Colon cancer 2016    Colon polyp     Concussion 1975    Diabetes 2010    Foot infection 1974    right    GERD (gastroesophageal reflux disease) 1992    Guillain Barré syndrome 1990    Hepatitis A 1995    Osteoarthritis 1990    Reflex sympathetic dystrophy 1997    RSD (reflex sympathetic dystrophy) 1992       Allergies and Medications reviewed     Review of Systems  General ROS: negative for - chills, fever or weight loss  Cardiovascular ROS: no chest pain or dyspnea on exertion  Gastrointestinal ROS: + GERD    Physical Examination  There were no vitals taken for this visit.  General appearance: alert, cooperative, no distress  HENT: Normocephalic, atraumatic, neck symmetrical, no nasal discharge, sclera anicteric   Lungs: clear to auscultation bilaterally, symmetric chest wall expansion bilaterally  Heart: regular rate and rhythm without rub; no displacement of the PMI   Abdomen: soft  Extremities: extremities symmetric; no clubbing, cyanosis, or edema    Assessment:   62 y.o. female presents for evaluation of GERD and dysphagia    Plan:  -proceed to EGD    Sonia Hook MD  [facility]  [address], [address]  [place] [address]  Office: [phone]  Fax: [phone]

## 2022-12-22 NOTE — [employer] PATIENT INSTRUCTIONS
Discharge Summary/Instructions after an Endoscopic Procedure  Patient Name: Daina Britt  Patient MRN: [id number]  Patient YOB: 1960 Thursday, December 22, 2022  Sonia Hook MD  Dear patient,  As a result of recent federal legislation (The Federal Cures Act), you may   receive lab or pathology results from your procedure in your [facility]   account before your physician is able to contact you. Your physician or   their representative will relay the results to you with their   recommendations at their soonest availability.  Thank you,  RESTRICTIONS:  During your procedure today, you received medications for sedation.  These   medications may affect your judgment, balance and coordination.  Therefore,   for 24 hours, you have the following restrictions:   - DO NOT drive a car, operate machinery, make legal/financial decisions,   sign important papers or drink alcohol.    ACTIVITY:  Today: no heavy lifting, straining or running due to procedural   sedation/anesthesia.  The following day: return to full activity including work.  DIET:  Eat and drink normally unless instructed otherwise.     TREATMENT FOR COMMON SIDE EFFECTS:  - Mild abdominal pain, nausea, belching, bloating or excessive gas:  rest,   eat lightly and use a heating pad.  - Sore Throat: treat with throat lozenges and/or gargle with warm salt   water.  - Because air was used during the procedure, expelling large amounts of air   from your rectum or belching is normal.  - If a bowel prep was taken, you may not have a bowel movement for 1-3 days.    This is normal.  SYMPTOMS TO WATCH FOR AND REPORT TO YOUR PHYSICIAN:  1. Abdominal pain or bloating, other than gas cramps.  2. Chest pain.  3. Back pain.  4. Signs of infection such as: chills or fever occurring within 24 hours   after the procedure.  5. Rectal bleeding, which would show as bright red, maroon, or black stools.   (A tablespoon of blood from the rectum is not serious,  especially if   hemorrhoids are present.)  6. Vomiting.  7. Weakness or dizziness.  GO DIRECTLY TO THE NEAREST EMERGENCY ROOM IF YOU HAVE ANY OF THE FOLLOWING:      Difficulty breathing              Chills and/or fever over 101 F   Persistent vomiting and/or vomiting blood   Severe abdominal pain   Severe chest pain   Black, tarry stools   Bleeding- more than one tablespoon   Any other symptom or condition that you feel may need urgent attention  Your doctor recommends these additional instructions:  If any biopsies were taken, your doctors clinic will contact you in 1 to 2   weeks with any results.  - Await pathology results.   - Discharge patient to home (with escort).   - Patient has a contact number available for emergencies.  The signs and   symptoms of potential delayed complications were discussed with the   patient.  Return to normal activities tomorrow.  Written discharge   instructions were provided to the patient.   - Resume previous diet.   - Continue present medications.   -Consider barium esophagram to evaluate for esophageal spasm, however   symptoms reported as infrequent  For questions, problems or results please call your physician - Sonia Hook MD at Work:  [phone].  [facility] EMERGENCY ROOM PHONE NUMBER: [phone]  IF A COMPLICATION OR EMERGENCY SITUATION ARISES AND YOU ARE UNABLE TO REACH   YOUR PHYSICIAN - GO DIRECTLY TO THE EMERGENCY ROOM.  Sonia Hook MD  12/22/2022 2:39:49 PM  This report has been verified and signed electronically.  Dear patient,  As a result of recent federal legislation (The Federal Cures Act), you may   receive lab or pathology results from your procedure in your [facility]   account before your physician is able to contact you. Your physician or   their representative will relay the results to you with their   recommendations at their soonest availability.  Thank you,  [employer]ATION

## 2022-12-23 NOTE — ANESTHESIA POSTPROCEDURE EVALUATION
Anesthesia Post Evaluation    Patient: Daina Britt    Procedure(s) Performed: Procedure(s) (LRB):  EGD (ESOPHAGOGASTRODUODENOSCOPY) (N/A)    Final Anesthesia Type: general      Patient location during evaluation: PACU  Patient participation: Yes- Able to Participate  Level of consciousness: sedated and awake  Post-procedure vital signs: reviewed and stable  Pain management: adequate  Airway patency: patent    PONV status at discharge: No PONV  Anesthetic complications: no      Cardiovascular status: hypertensive and blood pressure returned to baseline  Respiratory status: spontaneous ventilation  Hydration status: euvolemic  Follow-up not needed.          Vitals Value Taken Time   /77 12/22/22 1450   Temp  12/23/22 1524   Pulse 65 12/22/22 1450   Resp 16 12/22/22 1450   SpO2 99 % 12/22/22 1450         Event Time   Out of Recovery 15:18:15         Pain/Albert Score: Albert Score: 10 (12/22/2022  2:50 PM)

## 2022-12-29 ENCOUNTER — HOSPITAL ENCOUNTER (OUTPATIENT)
Facility: HOSPITAL | Age: 62
Discharge: HOME | End: 2022-12-29
Attending: INTERNAL MEDICINE | Admitting: INTERNAL MEDICINE
Payer: COMMERCIAL

## 2022-12-29 ENCOUNTER — ANESTHESIA EVENT (OUTPATIENT)
Dept: ENDOSCOPY | Facility: HOSPITAL | Age: 62
End: 2022-12-29
Payer: COMMERCIAL

## 2022-12-29 ENCOUNTER — ANESTHESIA (OUTPATIENT)
Dept: ENDOSCOPY | Facility: HOSPITAL | Age: 62
End: 2022-12-29
Payer: COMMERCIAL

## 2022-12-29 VITALS
HEART RATE: 70 BPM | OXYGEN SATURATION: 99 % | DIASTOLIC BLOOD PRESSURE: 82 MMHG | RESPIRATION RATE: 18 BRPM | SYSTOLIC BLOOD PRESSURE: 178 MMHG | TEMPERATURE: 98 F

## 2022-12-29 DIAGNOSIS — Z86.010 HISTORY OF COLON POLYPS: ICD-10-CM

## 2022-12-29 PROCEDURE — 45380 COLONOSCOPY AND BIOPSY: CPT | Mod: 33,,, | Performed by: INTERNAL MEDICINE

## 2022-12-29 PROCEDURE — D9220A PRA ANESTHESIA: ICD-10-PCS | Mod: 33,ANES,, | Performed by: ANESTHESIOLOGY

## 2022-12-29 PROCEDURE — 45380 PR COLONOSCOPY,BIOPSY: ICD-10-PCS | Mod: 33,,, | Performed by: INTERNAL MEDICINE

## 2022-12-29 PROCEDURE — D9220A PRA ANESTHESIA: Mod: 33,ANES,, | Performed by: ANESTHESIOLOGY

## 2022-12-29 PROCEDURE — 27201012 HC FORCEPS, HOT/COLD, DISP: Performed by: INTERNAL MEDICINE

## 2022-12-29 PROCEDURE — 25000003 PHARM REV CODE 250: Performed by: INTERNAL MEDICINE

## 2022-12-29 PROCEDURE — 37000008 HC ANESTHESIA 1ST 15 MINUTES: Performed by: INTERNAL MEDICINE

## 2022-12-29 PROCEDURE — 88305 TISSUE EXAM BY PATHOLOGIST: ICD-10-PCS | Mod: 26,,, | Performed by: PATHOLOGY

## 2022-12-29 PROCEDURE — 88305 TISSUE EXAM BY PATHOLOGIST: CPT | Mod: 26,,, | Performed by: PATHOLOGY

## 2022-12-29 PROCEDURE — 63600175 PHARM REV CODE 636 W HCPCS: Performed by: NURSE ANESTHETIST, CERTIFIED REGISTERED

## 2022-12-29 PROCEDURE — 45380 COLONOSCOPY AND BIOPSY: CPT | Mod: PT | Performed by: INTERNAL MEDICINE

## 2022-12-29 PROCEDURE — D9220A PRA ANESTHESIA: Mod: 33,CRNA,, | Performed by: NURSE ANESTHETIST, CERTIFIED REGISTERED

## 2022-12-29 PROCEDURE — D9220A PRA ANESTHESIA: ICD-10-PCS | Mod: 33,CRNA,, | Performed by: NURSE ANESTHETIST, CERTIFIED REGISTERED

## 2022-12-29 PROCEDURE — 37000009 HC ANESTHESIA EA ADD 15 MINS: Performed by: INTERNAL MEDICINE

## 2022-12-29 PROCEDURE — 88305 TISSUE EXAM BY PATHOLOGIST: CPT | Performed by: PATHOLOGY

## 2022-12-29 PROCEDURE — 25000003 PHARM REV CODE 250: Performed by: NURSE ANESTHETIST, CERTIFIED REGISTERED

## 2022-12-29 RX ORDER — LIDOCAINE HCL/PF 100 MG/5ML
SYRINGE (ML) INTRAVENOUS
Status: DISCONTINUED | OUTPATIENT
Start: 2022-12-29 | End: 2022-12-29

## 2022-12-29 RX ORDER — PROPOFOL 10 MG/ML
VIAL (ML) INTRAVENOUS
Status: DISCONTINUED | OUTPATIENT
Start: 2022-12-29 | End: 2022-12-29

## 2022-12-29 RX ORDER — SODIUM CHLORIDE 9 MG/ML
INJECTION, SOLUTION INTRAVENOUS CONTINUOUS
Status: DISCONTINUED | OUTPATIENT
Start: 2022-12-29 | End: 2022-12-29 | Stop reason: HOSPADM

## 2022-12-29 RX ADMIN — SODIUM CHLORIDE: 9 INJECTION, SOLUTION INTRAVENOUS at 11:12

## 2022-12-29 RX ADMIN — PROPOFOL 30 MG: 10 INJECTION, EMULSION INTRAVENOUS at 12:12

## 2022-12-29 RX ADMIN — PROPOFOL 40 MG: 10 INJECTION, EMULSION INTRAVENOUS at 12:12

## 2022-12-29 RX ADMIN — GLYCOPYRROLATE 0.2 MG: 0.2 INJECTION, SOLUTION INTRAMUSCULAR; INTRAVITREAL at 12:12

## 2022-12-29 RX ADMIN — LIDOCAINE HYDROCHLORIDE 100 MG: 20 INJECTION INTRAVENOUS at 12:12

## 2022-12-29 RX ADMIN — PROPOFOL 100 MG: 10 INJECTION, EMULSION INTRAVENOUS at 12:12

## 2022-12-29 RX ADMIN — PROPOFOL 50 MG: 10 INJECTION, EMULSION INTRAVENOUS at 12:12

## 2022-12-29 NOTE — ANESTHESIA PREPROCEDURE EVALUATION
12/29/2022  Daina Britt is a 62 y.o., female.      Pre-op Assessment    I have reviewed the Patient Summary Reports.     I have reviewed the Nursing Notes. I have reviewed the NPO Status.   I have reviewed the Medications.     Review of Systems  Anesthesia Hx:  No problems with previous Anesthesia    Social:  Non-Smoker    Hematology/Oncology:         -- Cancer in past history (colon CA):   Cardiovascular:  Cardiovascular Normal     Pulmonary:  Pulmonary Normal    Renal/:  Renal/ Normal     Hepatic/GI:   GERD Liver Disease, Hepatitis, A    Musculoskeletal:   Arthritis     Neurological:   Neuromuscular Disease, Seizures (Sz decades ago - due to medication) RSD R ankle  guillian barre  Chronic Pain Syndrome   Endocrine:   Diabetes, well controlled, type 2        Physical Exam  General: Well nourished, Cooperative, Alert and Oriented    Airway:  Mallampati: II   Mouth Opening: Normal  TM Distance: Normal  Neck ROM: Normal ROM        Anesthesia Plan  Type of Anesthesia, risks & benefits discussed:    Anesthesia Type: Gen ETT, Gen Supraglottic Airway, Gen Natural Airway, MAC  Intra-op Monitoring Plan: Standard ASA Monitors  Post Op Pain Control Plan: multimodal analgesia  Induction:  IV  Airway Plan: Direct, Video and Fiberoptic, Post-Induction  Informed Consent: Informed consent signed with the Patient and all parties understand the risks and agree with anesthesia plan.  All questions answered.   ASA Score: 3    Ready For Surgery From Anesthesia Perspective.     .

## 2022-12-29 NOTE — PROVATION PATIENT INSTRUCTIONS
Discharge Summary/Instructions after an Endoscopic Procedure  Patient Name: Daina Britt  Patient MRN: [id number]  Patient YOB: 1960 Thursday, December 29, 2022  Sonia Hook MD  Dear patient,  As a result of recent federal legislation (The Federal Cures Act), you may   receive lab or pathology results from your procedure in your [facility]   account before your physician is able to contact you. Your physician or   their representative will relay the results to you with their   recommendations at their soonest availability.  Thank you,  RESTRICTIONS:  During your procedure today, you received medications for sedation.  These   medications may affect your judgment, balance and coordination.  Therefore,   for 24 hours, you have the following restrictions:   - DO NOT drive a car, operate machinery, make legal/financial decisions,   sign important papers or drink alcohol.    ACTIVITY:  Today: no heavy lifting, straining or running due to procedural   sedation/anesthesia.  The following day: return to full activity including work.  DIET:  Eat and drink normally unless instructed otherwise.     TREATMENT FOR COMMON SIDE EFFECTS:  - Mild abdominal pain, nausea, belching, bloating or excessive gas:  rest,   eat lightly and use a heating pad.  - Sore Throat: treat with throat lozenges and/or gargle with warm salt   water.  - Because air was used during the procedure, expelling large amounts of air   from your rectum or belching is normal.  - If a bowel prep was taken, you may not have a bowel movement for 1-3 days.    This is normal.  SYMPTOMS TO WATCH FOR AND REPORT TO YOUR PHYSICIAN:  1. Abdominal pain or bloating, other than gas cramps.  2. Chest pain.  3. Back pain.  4. Signs of infection such as: chills or fever occurring within 24 hours   after the procedure.  5. Rectal bleeding, which would show as bright red, maroon, or black stools.   (A tablespoon of blood from the rectum is not serious,  especially if   hemorrhoids are present.)  6. Vomiting.  7. Weakness or dizziness.  GO DIRECTLY TO THE NEAREST EMERGENCY ROOM IF YOU HAVE ANY OF THE FOLLOWING:      Difficulty breathing              Chills and/or fever over 101 F   Persistent vomiting and/or vomiting blood   Severe abdominal pain   Severe chest pain   Black, tarry stools   Bleeding- more than one tablespoon   Any other symptom or condition that you feel may need urgent attention  Your doctor recommends these additional instructions:  If any biopsies were taken, your doctors clinic will contact you in 1 to 2   weeks with any results.  - Discharge patient to home (with escort).   - Patient has a contact number available for emergencies.  The signs and   symptoms of potential delayed complications were discussed with the   patient.  Return to normal activities tomorrow.  Written discharge   instructions were provided to the patient.   - Resume previous diet.   - Continue present medications.   - Await pathology results.   - Repeat colonoscopy in 5 years for surveillance based on pathology results.     - Return to my office PRN.  For questions, problems or results please call your physician - Sonia Hook MD at Work:  [phone].  [facility], EMERGENCY ROOM PHONE NUMBER: [phone]  IF A COMPLICATION OR EMERGENCY SITUATION ARISES AND YOU ARE UNABLE TO REACH   YOUR PHYSICIAN - GO DIRECTLY TO THE EMERGENCY ROOM.  Sonia Hook MD  12/29/2022 12:36:31 PM  This report has been verified and signed electronically.  Dear patient,  As a result of recent federal legislation (The Federal Cures Act), you may   receive lab or pathology results from your procedure in your [facility]   account before your physician is able to contact you. Your physician or   their representative will relay the results to you with their   recommendations at their soonest availability.  Thank you,  PROVATION

## 2022-12-29 NOTE — TRANSFER OF CARE
Anesthesia Transfer of Care Note    Patient: Daina Britt    Procedure(s) Performed: Procedure(s) (LRB):  COLONOSCOPY (N/A)    Patient location: PACU    Anesthesia Type: general    Transport from OR: Transported from OR on room air with adequate spontaneous ventilation    Post pain: adequate analgesia    Post assessment: no apparent anesthetic complications    Post vital signs: stable    Level of consciousness: awake    Nausea/Vomiting: no nausea/vomiting    Complications: none    Transfer of care protocol was followed      Last vitals:   Visit Vitals  BP (!) 193/84 (BP Location: Left arm, Patient Position: Lying)   Pulse 60   Temp 36.8 °C (98.2 °F) (Skin)   Resp 16   SpO2 99%   Breastfeeding No

## 2022-12-29 NOTE — TRANSFER OF CARE
Anesthesia Transfer of Care Note    Patient: Daina Brtit    Procedure(s) Performed: Procedure(s) (LRB):  COLONOSCOPY (N/A)    Patient location: PACU    Anesthesia Type: general    Transport from OR: Transported from OR on room air with adequate spontaneous ventilation    Post pain: adequate analgesia    Post assessment: no apparent anesthetic complications    Post vital signs: stable    Level of consciousness: awake    Nausea/Vomiting: no nausea/vomiting    Complications: none    Transfer of care protocol was followed      Last vitals:   Visit Vitals  BP (!) 193/84 (BP Location: Left arm, Patient Position: Lying)   Pulse 60   Temp 36.8 °C (98.2 °F) (Skin)   Resp 16   SpO2 99%   Breastfeeding No

## 2022-12-29 NOTE — H&P
[facility]    CC: History of colon polyps    HPI 62 y.o. female presents for history of colon polyps. Last colonoscopy > 5 years ago    Past Medical History:   Diagnosis Date    Cellulitis of left ankle 1998    Chronic pain 1990    Chronic pain     Colon cancer 2016    Colon polyp     Concussion 1975    Diabetes 2010    Foot infection 1974    right    GERD (gastroesophageal reflux disease) 1992    Guillain Barré syndrome 1990    Hepatitis A 1995    Osteoarthritis 1990    Reflex sympathetic dystrophy 1997    RSD (reflex sympathetic dystrophy) 1992       Allergies and Medications reviewed     Review of Systems  General ROS: negative for - chills, fever or weight loss  Cardiovascular ROS: no chest pain or dyspnea on exertion  Gastrointestinal ROS: no blood in stool    Physical Examination  There were no vitals taken for this visit.  General appearance: alert, cooperative, no distress  HENT: Normocephalic, atraumatic, neck symmetrical, no nasal discharge, sclera anicteric   Lungs: clear to auscultation bilaterally, symmetric chest wall expansion bilaterally  Heart: regular rate and rhythm without rub; no displacement of the PMI   Abdomen: soft  Extremities: extremities symmetric; no clubbing, cyanosis, or edema        Labs:  Lab Results   Component Value Date    WBC 5.45 08/12/2022    HGB 13.0 08/12/2022    HCT 40.4 08/12/2022    MCV 89 08/12/2022     08/12/2022         Assessment:   62 y.o. female presents for colonoscopy    Plan:  -Proceed to colonoscopy     Sonia Hook MD  [facility]  [address], [address]  [place] [address]  Office: [phone]  Fax: [phone]

## 2022-12-30 NOTE — ANESTHESIA POSTPROCEDURE EVALUATION
Anesthesia Post Evaluation    Patient: Daina Britt    Procedure(s) Performed: Procedure(s) (LRB):  COLONOSCOPY (N/A)    Final Anesthesia Type: general      Patient location during evaluation: PACU  Patient participation: Yes- Able to Participate  Level of consciousness: awake and alert and oriented  Post-procedure vital signs: reviewed and stable  Pain management: adequate  Airway patency: patent    PONV status at discharge: No PONV  Anesthetic complications: no      Cardiovascular status: blood pressure returned to baseline and stable  Respiratory status: unassisted and spontaneous ventilation  Hydration status: euvolemic  Follow-up not needed.          Vitals Value Taken Time   /82 12/29/22 1256   Temp 36.5 °C (97.7 °F) 12/29/22 1235   Pulse 70 12/29/22 1256   Resp 18 12/29/22 1256   SpO2 99 % 12/29/22 1256         Event Time   Out of Recovery 13:01:00         Pain/Albert Score: Albert Score: 10 (12/29/2022 12:56 PM)

## 2023-01-04 LAB
FINAL PATHOLOGIC DIAGNOSIS: NORMAL
GROSS: NORMAL
Lab: NORMAL

## 2023-01-05 LAB
FINAL PATHOLOGIC DIAGNOSIS: NORMAL
GROSS: NORMAL
Lab: NORMAL

## 2023-01-18 DIAGNOSIS — E11.9 TYPE 2 DIABETES MELLITUS WITHOUT COMPLICATION, UNSPECIFIED WHETHER LONG TERM INSULIN USE: ICD-10-CM

## 2023-01-25 ENCOUNTER — PATIENT OUTREACH (OUTPATIENT)
Dept: ADMINISTRATIVE | Facility: HOSPITAL | Age: 63
End: 2023-01-25
Payer: COMMERCIAL

## 2023-01-25 ENCOUNTER — PATIENT MESSAGE (OUTPATIENT)
Dept: ADMINISTRATIVE | Facility: HOSPITAL | Age: 63
End: 2023-01-25
Payer: COMMERCIAL

## 2023-01-25 DIAGNOSIS — E11.9 DIABETES MELLITUS WITHOUT COMPLICATION: Primary | ICD-10-CM

## 2023-01-25 NOTE — PROGRESS NOTES
WEEKLY BULK ORDER REPORT.  ORDER PLACED    DIABETIC TESTING DUE    Non-compliant report chart audits for DIABETIC LAB TEST    Outreach to patient in reference to SCHEDULING A LAB APPOINTMENT    Routine Dilated Eye Exam  (Diabetic Retinopathy screening)     Non-compliant report chart audits for Eye Exam for Patients with Diabetes     Outreach to patient in reference to a routine Eye Exam

## 2023-01-27 ENCOUNTER — LAB VISIT (OUTPATIENT)
Dept: LAB | Facility: HOSPITAL | Age: 63
End: 2023-01-27
Attending: FAMILY MEDICINE
Payer: COMMERCIAL

## 2023-01-27 ENCOUNTER — OFFICE VISIT (OUTPATIENT)
Dept: FAMILY MEDICINE | Facility: CLINIC | Age: 63
End: 2023-01-27
Payer: COMMERCIAL

## 2023-01-27 VITALS
BODY MASS INDEX: 28.06 KG/M2 | SYSTOLIC BLOOD PRESSURE: 148 MMHG | DIASTOLIC BLOOD PRESSURE: 86 MMHG | HEIGHT: 66 IN | HEART RATE: 58 BPM | OXYGEN SATURATION: 98 % | WEIGHT: 174.63 LBS | RESPIRATION RATE: 18 BRPM

## 2023-01-27 DIAGNOSIS — E11.9 TYPE 2 DIABETES MELLITUS WITHOUT COMPLICATION, WITHOUT LONG-TERM CURRENT USE OF INSULIN: ICD-10-CM

## 2023-01-27 DIAGNOSIS — I10 ESSENTIAL HYPERTENSION: Primary | ICD-10-CM

## 2023-01-27 DIAGNOSIS — Z00.00 WELLNESS EXAMINATION: ICD-10-CM

## 2023-01-27 DIAGNOSIS — I73.00 RAYNAUD'S DISEASE WITHOUT GANGRENE: ICD-10-CM

## 2023-01-27 PROCEDURE — 99214 PR OFFICE/OUTPT VISIT, EST, LEVL IV, 30-39 MIN: ICD-10-PCS | Mod: S$GLB,,, | Performed by: FAMILY MEDICINE

## 2023-01-27 PROCEDURE — 3008F PR BODY MASS INDEX (BMI) DOCUMENTED: ICD-10-PCS | Mod: CPTII,S$GLB,, | Performed by: FAMILY MEDICINE

## 2023-01-27 PROCEDURE — 3008F BODY MASS INDEX DOCD: CPT | Mod: CPTII,S$GLB,, | Performed by: FAMILY MEDICINE

## 2023-01-27 PROCEDURE — 83036 HEMOGLOBIN GLYCOSYLATED A1C: CPT | Performed by: FAMILY MEDICINE

## 2023-01-27 PROCEDURE — 99999 PR PBB SHADOW E&M-EST. PATIENT-LVL IV: CPT | Mod: PBBFAC,,, | Performed by: FAMILY MEDICINE

## 2023-01-27 PROCEDURE — 82570 ASSAY OF URINE CREATININE: CPT | Performed by: FAMILY MEDICINE

## 2023-01-27 PROCEDURE — 1160F PR REVIEW ALL MEDS BY PRESCRIBER/CLIN PHARMACIST DOCUMENTED: ICD-10-PCS | Mod: CPTII,S$GLB,, | Performed by: FAMILY MEDICINE

## 2023-01-27 PROCEDURE — 3079F PR MOST RECENT DIASTOLIC BLOOD PRESSURE 80-89 MM HG: ICD-10-PCS | Mod: CPTII,S$GLB,, | Performed by: FAMILY MEDICINE

## 2023-01-27 PROCEDURE — 1159F MED LIST DOCD IN RCRD: CPT | Mod: CPTII,S$GLB,, | Performed by: FAMILY MEDICINE

## 2023-01-27 PROCEDURE — 3077F PR MOST RECENT SYSTOLIC BLOOD PRESSURE >= 140 MM HG: ICD-10-PCS | Mod: CPTII,S$GLB,, | Performed by: FAMILY MEDICINE

## 2023-01-27 PROCEDURE — 1160F RVW MEDS BY RX/DR IN RCRD: CPT | Mod: CPTII,S$GLB,, | Performed by: FAMILY MEDICINE

## 2023-01-27 PROCEDURE — 1159F PR MEDICATION LIST DOCUMENTED IN MEDICAL RECORD: ICD-10-PCS | Mod: CPTII,S$GLB,, | Performed by: FAMILY MEDICINE

## 2023-01-27 PROCEDURE — 36415 COLL VENOUS BLD VENIPUNCTURE: CPT | Mod: PO | Performed by: FAMILY MEDICINE

## 2023-01-27 PROCEDURE — 3079F DIAST BP 80-89 MM HG: CPT | Mod: CPTII,S$GLB,, | Performed by: FAMILY MEDICINE

## 2023-01-27 PROCEDURE — 87389 HIV-1 AG W/HIV-1&-2 AB AG IA: CPT | Performed by: FAMILY MEDICINE

## 2023-01-27 PROCEDURE — 86803 HEPATITIS C AB TEST: CPT | Performed by: FAMILY MEDICINE

## 2023-01-27 PROCEDURE — 99214 OFFICE O/P EST MOD 30 MIN: CPT | Mod: S$GLB,,, | Performed by: FAMILY MEDICINE

## 2023-01-27 PROCEDURE — 99999 PR PBB SHADOW E&M-EST. PATIENT-LVL IV: ICD-10-PCS | Mod: PBBFAC,,, | Performed by: FAMILY MEDICINE

## 2023-01-27 PROCEDURE — 3077F SYST BP >= 140 MM HG: CPT | Mod: CPTII,S$GLB,, | Performed by: FAMILY MEDICINE

## 2023-01-27 RX ORDER — AMLODIPINE BESYLATE 5 MG/1
5 TABLET ORAL DAILY
Qty: 90 TABLET | Refills: 3 | Status: SHIPPED | OUTPATIENT
Start: 2023-01-27 | End: 2024-01-16

## 2023-01-27 NOTE — PROGRESS NOTES
"Subjective:       Patient ID: Daina Britt is a 62 y.o. female.    Chief Complaint: Annual Exam and Numbness (Numbness and tingling noted to fingers.  Pt. Also reports that fingers turn white and hurt.)    HPI:  Pleasant 62-year-old  who is here today for follow-up of.  The patient reports Raynaud phenomena occurring on a daily basis.  Her systolic blood pressures have been above 140 for the past several months at least.  Asymptomatic.  No over symptoms of CAD CHF.  Would recommend we add on amlodipine to both control blood pressure better since she is already on an ARB low-dose.  Hopefully will help with the right now as and advised her to keep her hands warm    She has undergone both upper and lower endoscopy this last December without any significant pathology on biopsy.    Recent eye examination done shows cataract formation but no retinal disease secondary to type 2 diabetes.  Patient continues on metformin.  She is due for A1c and microalbumin creatinine ratio spot urine.    Review of Systems   Constitutional: Negative.    HENT: Negative.     Eyes: Negative.    Respiratory: Negative.     Cardiovascular: Negative.    Gastrointestinal: Negative.    Endocrine: Negative.    Genitourinary: Negative.    Musculoskeletal: Negative.    Skin: Negative.         It changes of blanching of the digits consistent with Raynaud's phenomena.   Allergic/Immunologic: Negative.    Neurological: Negative.    Hematological: Negative.    Psychiatric/Behavioral: Negative.       Objective:      Vitals:    01/27/23 0927   BP: (!) 148/86   Pulse: (!) 58   Resp: 18   SpO2: 98%   Weight: 79.2 kg (174 lb 9.7 oz)   Height: 5' 5.5" (1.664 m)      Physical Exam  Vitals and nursing note reviewed.   Constitutional:       Appearance: Normal appearance. She is normal weight.   HENT:      Head: Normocephalic and atraumatic.      Nose: Nose normal.      Mouth/Throat:      Mouth: Mucous membranes are moist.      Pharynx: Oropharynx is " "clear.   Eyes:      Extraocular Movements: Extraocular movements intact.      Conjunctiva/sclera: Conjunctivae normal.      Pupils: Pupils are equal, round, and reactive to light.   Cardiovascular:      Rate and Rhythm: Normal rate and regular rhythm.      Pulses: Normal pulses.      Heart sounds: Normal heart sounds.   Pulmonary:      Effort: Pulmonary effort is normal.      Breath sounds: Normal breath sounds.   Abdominal:      General: Abdomen is flat. Bowel sounds are normal.      Palpations: Abdomen is soft.   Musculoskeletal:         General: Normal range of motion.      Cervical back: Normal range of motion and neck supple.   Skin:     General: Skin is warm and dry.      Capillary Refill: Capillary refill takes less than 2 seconds.   Neurological:      General: No focal deficit present.      Mental Status: She is alert and oriented to person, place, and time. Mental status is at baseline.   Psychiatric:         Mood and Affect: Mood normal.         Behavior: Behavior normal.         Thought Content: Thought content normal.         Judgment: Judgment normal.       Results for orders placed or performed during the hospital encounter of 12/29/22   Specimen to Pathology, Surgery Gastrointestinal tract   Result Value Ref Range    Final Pathologic Diagnosis 1. Rectal polyp, biopsy:  Hyperplastic polyp     Gross       Container Label: Clinic Number/AP Number:  [id number] / [id number], and "rectal  polyp"  Received in formalin is a 3 x 2 x 2 mm soft tan polypoid tissue fragment.  Entirely submitted in JIM--1-A.  Annie Napoles PLauraA.      Disclaimer       Unless the case is a 'gross only' or additional testing only, the final  diagnosis for each specimen is based on a microscopic examination of  appropriate tissue sections.        Assessment:       1. Essential hypertension    2. Raynaud's disease without gangrene    3. Type 2 diabetes mellitus without complication, without long-term current use of insulin    4. " Wellness examination        Plan:       Essential hypertension  -     amLODIPine (NORVASC) 5 MG tablet; Take 1 tablet (5 mg total) by mouth once daily.  Dispense: 90 tablet; Refill: 3    Raynaud's disease without gangrene  -     amLODIPine (NORVASC) 5 MG tablet; Take 1 tablet (5 mg total) by mouth once daily.  Dispense: 90 tablet; Refill: 3    Type 2 diabetes mellitus without complication, without long-term current use of insulin  -     Hemoglobin A1C; Future; Expected date: 01/27/2023  -     Microalbumin/Creatinine Ratio, Urine    Wellness examination  -     HIV 1/2 Ag/Ab (4th Gen); Future; Expected date: 01/27/2023  -     Hepatitis C Antibody; Future; Expected date: 01/27/2023          Medication List with Changes/Refills   New Medications    AMLODIPINE (NORVASC) 5 MG TABLET    Take 1 tablet (5 mg total) by mouth once daily.   Current Medications    ASPIRIN (ECOTRIN) 81 MG EC TABLET    Take 81 mg by mouth once daily. Take one daily    ATORVASTATIN (LIPITOR) 40 MG TABLET    Take 40 mg by mouth every evening.    CARBOXYMETHYLCELLULOSE (REFRESH PLUS) 0.5 % DPET    1 drop 3 (three) times daily as needed.    IBUPROFEN-ACETAMINOPHEN (ADVIL DUAL ACTION) 125-250 MG TAB    Take by mouth every 8 (eight) hours. Take 2 twice a day    LOSARTAN (COZAAR) 50 MG TABLET    Take 1 tablet (50 mg total) by mouth once daily.    MAG/ALUMINUM/SOD BICARB/ALGINC (GAVISCON ORAL)    Take 0.5 fluid ounces by mouth once daily.    METFORMIN (GLUCOPHAGE) 500 MG TABLET    Take 1 tablet (500 mg total) by mouth 2 (two) times daily.    OMEPRAZOLE (PRILOSEC) 20 MG CAPSULE    Take 1 capsule by mouth twice daily    ROSUVASTATIN (CRESTOR) 20 MG TABLET    Take 1 tablet by mouth once daily

## 2023-01-28 LAB
ALBUMIN/CREAT UR: 16.7 UG/MG (ref 0–30)
CREAT UR-MCNC: 60 MG/DL (ref 15–325)
ESTIMATED AVG GLUCOSE: 128 MG/DL (ref 68–131)
HBA1C MFR BLD: 6.1 % (ref 4–5.6)
HCV AB SERPL QL IA: NORMAL
HIV 1+2 AB+HIV1 P24 AG SERPL QL IA: NORMAL
MICROALBUMIN UR DL<=1MG/L-MCNC: 10 UG/ML

## 2023-02-06 ENCOUNTER — TELEPHONE (OUTPATIENT)
Dept: GASTROENTEROLOGY | Facility: CLINIC | Age: 63
End: 2023-02-06
Payer: COMMERCIAL

## 2023-04-11 ENCOUNTER — PATIENT MESSAGE (OUTPATIENT)
Dept: ADMINISTRATIVE | Facility: HOSPITAL | Age: 63
End: 2023-04-11
Payer: COMMERCIAL

## 2023-05-02 ENCOUNTER — OFFICE VISIT (OUTPATIENT)
Dept: FAMILY MEDICINE | Facility: CLINIC | Age: 63
End: 2023-05-02
Payer: COMMERCIAL

## 2023-05-02 VITALS
DIASTOLIC BLOOD PRESSURE: 80 MMHG | OXYGEN SATURATION: 97 % | WEIGHT: 183 LBS | HEART RATE: 72 BPM | HEIGHT: 66 IN | SYSTOLIC BLOOD PRESSURE: 132 MMHG | RESPIRATION RATE: 18 BRPM | BODY MASS INDEX: 29.41 KG/M2

## 2023-05-02 DIAGNOSIS — I10 ESSENTIAL HYPERTENSION: Primary | ICD-10-CM

## 2023-05-02 DIAGNOSIS — I73.00 RAYNAUD'S DISEASE WITHOUT GANGRENE: ICD-10-CM

## 2023-05-02 PROCEDURE — 3061F PR NEG MICROALBUMINURIA RESULT DOCUMENTED/REVIEW: ICD-10-PCS | Mod: CPTII,S$GLB,, | Performed by: FAMILY MEDICINE

## 2023-05-02 PROCEDURE — 3008F PR BODY MASS INDEX (BMI) DOCUMENTED: ICD-10-PCS | Mod: CPTII,S$GLB,, | Performed by: FAMILY MEDICINE

## 2023-05-02 PROCEDURE — 99999 PR PBB SHADOW E&M-EST. PATIENT-LVL IV: ICD-10-PCS | Mod: PBBFAC,,, | Performed by: FAMILY MEDICINE

## 2023-05-02 PROCEDURE — 3079F DIAST BP 80-89 MM HG: CPT | Mod: CPTII,S$GLB,, | Performed by: FAMILY MEDICINE

## 2023-05-02 PROCEDURE — 3066F PR DOCUMENTATION OF TREATMENT FOR NEPHROPATHY: ICD-10-PCS | Mod: CPTII,S$GLB,, | Performed by: FAMILY MEDICINE

## 2023-05-02 PROCEDURE — 99213 OFFICE O/P EST LOW 20 MIN: CPT | Mod: S$GLB,,, | Performed by: FAMILY MEDICINE

## 2023-05-02 PROCEDURE — 1160F RVW MEDS BY RX/DR IN RCRD: CPT | Mod: CPTII,S$GLB,, | Performed by: FAMILY MEDICINE

## 2023-05-02 PROCEDURE — 4010F PR ACE/ARB THEARPY RXD/TAKEN: ICD-10-PCS | Mod: CPTII,S$GLB,, | Performed by: FAMILY MEDICINE

## 2023-05-02 PROCEDURE — 1159F PR MEDICATION LIST DOCUMENTED IN MEDICAL RECORD: ICD-10-PCS | Mod: CPTII,S$GLB,, | Performed by: FAMILY MEDICINE

## 2023-05-02 PROCEDURE — 3044F HG A1C LEVEL LT 7.0%: CPT | Mod: CPTII,S$GLB,, | Performed by: FAMILY MEDICINE

## 2023-05-02 PROCEDURE — 3075F SYST BP GE 130 - 139MM HG: CPT | Mod: CPTII,S$GLB,, | Performed by: FAMILY MEDICINE

## 2023-05-02 PROCEDURE — 99999 PR PBB SHADOW E&M-EST. PATIENT-LVL IV: CPT | Mod: PBBFAC,,, | Performed by: FAMILY MEDICINE

## 2023-05-02 PROCEDURE — 3075F PR MOST RECENT SYSTOLIC BLOOD PRESS GE 130-139MM HG: ICD-10-PCS | Mod: CPTII,S$GLB,, | Performed by: FAMILY MEDICINE

## 2023-05-02 PROCEDURE — 4010F ACE/ARB THERAPY RXD/TAKEN: CPT | Mod: CPTII,S$GLB,, | Performed by: FAMILY MEDICINE

## 2023-05-02 PROCEDURE — 99213 PR OFFICE/OUTPT VISIT, EST, LEVL III, 20-29 MIN: ICD-10-PCS | Mod: S$GLB,,, | Performed by: FAMILY MEDICINE

## 2023-05-02 PROCEDURE — 3044F PR MOST RECENT HEMOGLOBIN A1C LEVEL <7.0%: ICD-10-PCS | Mod: CPTII,S$GLB,, | Performed by: FAMILY MEDICINE

## 2023-05-02 PROCEDURE — 3079F PR MOST RECENT DIASTOLIC BLOOD PRESSURE 80-89 MM HG: ICD-10-PCS | Mod: CPTII,S$GLB,, | Performed by: FAMILY MEDICINE

## 2023-05-02 PROCEDURE — 3008F BODY MASS INDEX DOCD: CPT | Mod: CPTII,S$GLB,, | Performed by: FAMILY MEDICINE

## 2023-05-02 PROCEDURE — 3061F NEG MICROALBUMINURIA REV: CPT | Mod: CPTII,S$GLB,, | Performed by: FAMILY MEDICINE

## 2023-05-02 PROCEDURE — 1160F PR REVIEW ALL MEDS BY PRESCRIBER/CLIN PHARMACIST DOCUMENTED: ICD-10-PCS | Mod: CPTII,S$GLB,, | Performed by: FAMILY MEDICINE

## 2023-05-02 PROCEDURE — 1159F MED LIST DOCD IN RCRD: CPT | Mod: CPTII,S$GLB,, | Performed by: FAMILY MEDICINE

## 2023-05-02 PROCEDURE — 3066F NEPHROPATHY DOC TX: CPT | Mod: CPTII,S$GLB,, | Performed by: FAMILY MEDICINE

## 2023-05-02 RX ORDER — LORATADINE 10 MG/1
10 TABLET ORAL DAILY
COMMUNITY

## 2023-05-02 RX ORDER — FLUTICASONE PROPIONATE 50 MCG
1 SPRAY, SUSPENSION (ML) NASAL DAILY
COMMUNITY

## 2023-05-02 NOTE — PROGRESS NOTES
"Subjective:       Patient ID: Daina Britt is a 62 y.o. female.    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  Blood pressure readings at [facility] has been elevated.  Here today was normal.  Before adding on diuretics do recommend that she viral cuff and do a nurse visit for accuracy correlation here next week or 2.  Patient also also having some left-sided hip pain that is see her DJD or trochanteric bursitis.  She will do her own PT exercises.  She is unable to have steroid injections secondary to having developed RSD.      Review of Systems   Constitutional: Negative.    HENT: Negative.     Eyes: Negative.    Respiratory: Negative.     Cardiovascular: Negative.    Gastrointestinal: Negative.    Endocrine: Negative.    Genitourinary: Negative.    Musculoskeletal: Negative.    Skin: Negative.    Allergic/Immunologic: Negative.    Neurological: Negative.    Hematological: Negative.    Psychiatric/Behavioral: Negative.       Objective:      Vitals:    05/02/23 1036   BP: 132/80   Pulse: 72   Resp: 18   SpO2: 97%   Weight: 83 kg (182 lb 15.7 oz)   Height: 5' 5.5" (1.664 m)      Physical Exam  Vitals and nursing note reviewed.   Constitutional:       Appearance: Normal appearance. She is normal weight.   HENT:      Head: Normocephalic and atraumatic.      Nose: Nose normal.      Mouth/Throat:      Mouth: Mucous membranes are moist.      Pharynx: Oropharynx is clear.   Eyes:      Extraocular Movements: Extraocular movements intact.      Conjunctiva/sclera: Conjunctivae normal.      Pupils: Pupils are equal, round, and reactive to light.   Cardiovascular:      Rate and Rhythm: Normal rate and regular rhythm.      Pulses: Normal pulses.      Heart sounds: Normal heart sounds.   Pulmonary:      Effort: Pulmonary effort is normal.      Breath sounds: Normal breath sounds.   Abdominal:      General: Abdomen is flat. Bowel sounds " are normal.      Palpations: Abdomen is soft.   Musculoskeletal:         General: Normal range of motion.      Cervical back: Normal range of motion and neck supple.   Skin:     General: Skin is warm and dry.      Capillary Refill: Capillary refill takes less than 2 seconds.   Neurological:      General: No focal deficit present.      Mental Status: She is alert and oriented to person, place, and time. Mental status is at baseline.   Psychiatric:         Mood and Affect: Mood normal.         Behavior: Behavior normal.         Thought Content: Thought content normal.         Judgment: Judgment normal.       Results for orders placed or performed in visit on 01/27/23   HIV 1/2 Ag/Ab (4th Gen)   Result Value Ref Range    HIV 1/2 Ag/Ab Non-reactive Non-reactive   Hepatitis C Antibody   Result Value Ref Range    Hepatitis C Ab Non-reactive Non-reactive   Hemoglobin A1C   Result Value Ref Range    Hemoglobin A1C 6.1 (H) 4.0 - 5.6 %    Estimated Avg Glucose 128 68 - 131 mg/dL      Assessment:       1. Essential hypertension    2. Raynaud's disease without gangrene        Plan:       Essential hypertension  Comments:  Appears to be controlled but a new BP cuff fully automatic with the largest cuff size and a nurse visit for correlation recommended.    Raynaud's disease without gangrene  Comments:  Improved on amlodipine.  May want to increase the dose to 10 mg in the near future.          Medication List with Changes/Refills   Current Medications    AMLODIPINE (NORVASC) 5 MG TABLET    Take 1 tablet (5 mg total) by mouth once daily.    ASPIRIN (ECOTRIN) 81 MG EC TABLET    Take 81 mg by mouth once daily. Take one daily    CARBOXYMETHYLCELLULOSE (REFRESH PLUS) 0.5 % DPET    1 drop 3 (three) times daily as needed.    FLUTICASONE PROPIONATE (FLONASE) 50 MCG/ACTUATION NASAL SPRAY    1 spray by Each Nostril route once daily.    IBUPROFEN-ACETAMINOPHEN (ADVIL DUAL ACTION) 125-250 MG TAB    Take by mouth every 8 (eight) hours. Take  2 twice a day    LORATADINE (CLARITIN) 10 MG TABLET    Take 10 mg by mouth once daily.    LOSARTAN (COZAAR) 50 MG TABLET    Take 1 tablet by mouth once daily    MAG/ALUMINUM/SOD BICARB/ALGINC (GAVISCON ORAL)    Take 0.5 fluid ounces by mouth once daily.    METFORMIN (GLUCOPHAGE) 500 MG TABLET    Take 1 tablet by mouth twice daily    OMEPRAZOLE (PRILOSEC) 20 MG CAPSULE    Take 1 capsule by mouth twice daily    ROSUVASTATIN (CRESTOR) 20 MG TABLET    Take 1 tablet by mouth once daily

## 2023-07-01 DIAGNOSIS — K21.9 GASTROESOPHAGEAL REFLUX DISEASE WITHOUT ESOPHAGITIS: ICD-10-CM

## 2023-07-02 RX ORDER — OMEPRAZOLE 20 MG/1
CAPSULE, DELAYED RELEASE ORAL
Qty: 180 CAPSULE | Refills: 3 | Status: SHIPPED | OUTPATIENT
Start: 2023-07-02

## 2023-07-02 NOTE — TELEPHONE ENCOUNTER
Care Due:                  Date            Visit Type   Department     Provider  --------------------------------------------------------------------------------                                EP -                              [facility] FAMILY  Last Visit: 05-      CARE ([facility])   ROCIO Garnica                              EP -                              PRIMARY      [facility] FAM[facility]  Next Visit: 11-      CARE ([facility])   MEDICINE       Ervin Garnica                                                            Last  Test          Frequency    Reason                     Performed    Due Date  --------------------------------------------------------------------------------    CMP.........  12 months..  losartan, metFORMIN,       08- 08-                             rosuvastatin.............    HBA1C.......  6 months...  metFORMIN................  01- 07-    Lipid Panel.  12 months..  rosuvastatin.............  07-   07-    [facility] Embedded Care Due Messages. Reference number: [id number].   7/01/2023 10:59:04 PM CDT

## 2023-07-02 NOTE — TELEPHONE ENCOUNTER
Provider Staff:  Action required for this patient     Please see care gap opportunities below in Care Due Message.    Thanks!  [facility]     Appointments      Date Provider   Last Visit   5/2/2023 Ervin Garnica MD   Next Visit   11/2/2023 Ervin Garnica MD     Refill Decision Note   Daina Britt  is requesting a refill authorization.  Brief Assessment and Rationale for Refill:  Approve     Medication Therapy Plan:         Comments:     Note composed:2:56 PM 07/02/2023             Appointments     Last Visit   5/2/2023 Ervin Garnica MD   Next Visit   11/2/2023 Ervin Garnica MD

## 2023-07-16 DIAGNOSIS — E11.9 TYPE 2 DIABETES MELLITUS WITHOUT COMPLICATION, WITHOUT LONG-TERM CURRENT USE OF INSULIN: ICD-10-CM

## 2023-07-17 RX ORDER — METFORMIN HYDROCHLORIDE 500 MG/1
TABLET ORAL
Qty: 180 TABLET | Refills: 0 | Status: SHIPPED | OUTPATIENT
Start: 2023-07-17 | End: 2023-10-10

## 2023-07-17 NOTE — TELEPHONE ENCOUNTER
[facility], [place]    Chaplin Discharge Summary    Date of Admission:  2017  7:50 PM  Date of Discharge:  2017    Primary Care Physician   Primary care provider: [facility]    Discharge Diagnoses   Patient Active Problem List    Diagnosis Date Noted     Normal  (single liveborn) 2017     Priority: Medium       Hospital Course   Baby1 Dionne Pepper is a Term  appropriate for gestational age female  Chaplin who was born at 2017 7:50 PM by  , Low Transverse.    Hearing screen:  Patient Vitals for the past 72 hrs:   Hearing Screen Date   17 0917 0917     No data found.    Patient Vitals for the past 72 hrs:   Hearing Screening Method   17 09 ABR   17 09 ABR       Oxygen screen:  Patient Vitals for the past 72 hrs:   Chaplin Pulse Oximetry - Right Arm (%)   17 100 %     Patient Vitals for the past 72 hrs:    Pulse Oximetry - Foot (%)   17 100 %     Patient Vitals for the past 72 hrs:   Critical Congen Heart Defect Test Result   17 pass       Patient Active Problem List   Diagnosis     Normal  (single liveborn)       Feeding: Breast feeding going well, good supply. Working on latch.  Is at 10% down now.     Plan:  -Discharge to home with parents  -Follow-up with PCP in 2-3 days  -Anticipatory guidance given    Megha Oropeza    Consultations This Hospital Stay   LACTATION IP CONSULT  NURSE PRACT  IP CONSULT    Discharge Orders     Activity   Developmentally appropriate care and safe sleep practices (infant on back with no use of pillows).     Follow Up - Clinic Visit   Follow-up with clinic visit /physician within 2-3 days if age < 72 hrs, or breastfeeding, or risk for jaundice.     Breastfeeding or formula   Breast feeding or formula every 2-3 hours or on demand.       Pending Results   These results will be followed up by PCP   Unresulted  No care due was identified.  [facility] Embedded Care Due Messages. Reference number: [id number].   7/16/2023 8:25:36 PM CDT   Labs Ordered in the Past 30 Days of this Admission     Date and Time Order Name Status Description    2017 1600  metabolic screen In process           Discharge Medications   There are no discharge medications for this patient.    Allergies   No Known Allergies    Immunization History   Immunization History   Administered Date(s) Administered     HepB-Peds 2017        Significant Results and Procedures   none    Physical Exam   Vital Signs:  Patient Vitals for the past 24 hrs:   Temp Temp src Heart Rate Resp Weight   17 0900 97.9  F (36.6  C) Axillary 148 48 6 lb 5.4 oz (2.875 kg)   17 0127 99  F (37.2  C) Axillary 148 40 -   17 2134 - - - - 6 lb 5.8 oz (2.886 kg)   17 2035 98.4  F (36.9  C) Axillary 144 42 -   17 1830 98.6  F (37  C) Axillary 140 40 -   17 1100 97.8  F (36.6  C) Axillary 148 48 -     Wt Readings from Last 3 Encounters:   17 6 lb 5.4 oz (2.875 kg) (16 %)*     * Growth percentiles are based on WHO (Girls, 0-2 years) data.     Weight change since birth: -10%    GEN: no distress  HEAD:  Normocephalic, atruamtaic , anterior fontanelle open/soft/flat  EYES: no discharge or injection, extraocular muscles intact, equal pupils reactive to light, + red reflex bilat , symmetric pupil light reflex  EARS: normal shape, no pits/tags  NOSE: no edema, no discharge  MOUTH: MMM, palate intact  NECK: supple, no asymmetry, full ROM  RESP: no increased work of breathing, clear to auscultation bilat, good air entry bilat  CVS: Regular rate and rhythm, no murmur or extra heart sounds, femoral pulses 2+  ABD: soft, nontender, no mass, no hepatosplenomegaly   Female: WNL external genitalia, no labial adhesion  RECTAL: normal tone, no fissures or tags  MSK: no deformities, FROM all extremities, hips stable bilat  SKIN: no rashes, warm well perfused  NEURO: Nonfocal     Data   All laboratory data reviewed  Serum bilirubin:  Recent Labs  Lab 17  4021    BILITOTAL 4.8       Recent Labs  Lab 08/25/17 1950   ABO A   RH Neg   GDAT Neg       bilitool

## 2023-07-17 NOTE — TELEPHONE ENCOUNTER
Refill Decision Note   Daina Britt  is requesting a refill authorization.  Brief Assessment and Rationale for Refill:  Approve     Medication Therapy Plan:         Comments:     Note composed:1:28 PM 07/17/2023             Appointments     Last Visit   5/2/2023 Ervin Garnica MD   Next Visit   11/2/2023 Ervin Garnica MD

## 2023-07-18 DIAGNOSIS — E11.9 TYPE 2 DIABETES MELLITUS WITHOUT COMPLICATION: ICD-10-CM

## 2023-07-21 ENCOUNTER — PATIENT MESSAGE (OUTPATIENT)
Dept: ADMINISTRATIVE | Facility: HOSPITAL | Age: 63
End: 2023-07-21
Payer: COMMERCIAL

## 2023-07-21 ENCOUNTER — PATIENT OUTREACH (OUTPATIENT)
Dept: ADMINISTRATIVE | Facility: HOSPITAL | Age: 63
End: 2023-07-21
Payer: COMMERCIAL

## 2023-07-21 DIAGNOSIS — E11.9 DIABETES MELLITUS WITHOUT COMPLICATION: Primary | ICD-10-CM

## 2023-07-21 NOTE — LETTER
July 27, 2023    Daina Britt  [address] [place] [address]             [facility]  [address]  [facility] [address]  Phone: [phone]     Dear Sharon, [facility] wants to help patients achieve their health goals. Our records show that you may have been told you have diabetes.     Diabetes is a medical condition that needs to be managed all the time to reduce your risk of things like heart, kidney and eye disease. Your [facility] primary care team wants to be sure you get important tests and screenings done regularly to assure that your health needs are met.  If you believe this diagnosis of diabetes is in error, please let your primary care physician or care team know so that he/she can update your health record.     Our records indicate you may be overdue for the following:     Topic    Eye Exam     Lipid (Cholesterol) Test     Hemoglobin A1C       If you recently had any of the above test done at another facility, please let your primary care provider know so that they can update your health record.  Please send a message to your primary care physician via [email or web address] or contact his/her office at [phone]. If you have a copy of these records, please provide a copy so that we may update your records.  Also, You are welcome to request that the report be faxed to us at ([phone]).       If you have an upcoming scheduled appointment for the above test and/or procedures, please disregard this letter.  Otherwise, please send a message via [email or web address] or by calling [phone] and we will assist in scheduling these appointments for you.        Sincerely,  Ervin Garnica MD and your [facility] Primary [facility] Team

## 2023-08-13 ENCOUNTER — HOSPITAL ENCOUNTER (EMERGENCY)
Facility: HOSPITAL | Age: 63
Discharge: HOME | End: 2023-08-13
Attending: EMERGENCY MEDICINE
Payer: COMMERCIAL

## 2023-08-13 VITALS
WEIGHT: 170 LBS | TEMPERATURE: 99 F | HEART RATE: 69 BPM | BODY MASS INDEX: 28.32 KG/M2 | DIASTOLIC BLOOD PRESSURE: 75 MMHG | RESPIRATION RATE: 18 BRPM | SYSTOLIC BLOOD PRESSURE: 143 MMHG | HEIGHT: 65 IN | OXYGEN SATURATION: 95 %

## 2023-08-13 DIAGNOSIS — U07.1 COVID-19: ICD-10-CM

## 2023-08-13 LAB — GLUCOSE SERPL-MCNC: 113 MG/DL (ref 70–110)

## 2023-08-13 PROCEDURE — 99283 EMERGENCY DEPT VISIT LOW MDM: CPT | Mod: 25

## 2023-08-13 PROCEDURE — 82962 GLUCOSE BLOOD TEST: CPT

## 2023-08-13 NOTE — ED PROVIDER NOTES
Encounter Date: 8/13/2023       History     Chief Complaint   Patient presents with    Cough     Pt states she tested positive for covid on Thursday and is not getting better.        Patient presents complaining of persisting cough and not feeling well.  Patient diagnosed with COVID illness on Thursday.  Patient has been taking Paxlovid.  She denies any difficulty breathing or shortness of breath.  She complains of fatigue and malaise.      Review of patient's allergies indicates:   Allergen Reactions    Iodinated contrast media Other (See Comments)     Myelogram dye-causes seizures    Titanium analogues Swelling     Titanium screws     Cortisone Other (See Comments)     Cortisone injection  (RSDS)(CRPS)    Influenza virus vaccines      Guillian barre    Tetanus vaccines and toxoid      Guillian barre syndrome      Codeine Nausea And Vomiting and Rash    Darvon [propoxyphene] Nausea And Vomiting and Rash    Demerol [meperidine] Nausea And Vomiting and Rash    Opioids - morphine analogues Nausea And Vomiting and Rash    Paragoric Nausea And Vomiting and Rash    Wygesic Nausea And Vomiting and Rash     Past Medical History:   Diagnosis Date    Cellulitis of left ankle 1998    Chronic pain 1990    Chronic pain     Colon cancer 2016    Colon polyp     Concussion 1975    Diabetes 2010    Foot infection 1974    right    GERD (gastroesophageal reflux disease) 1992    Guillain Barré syndrome 1990    Hepatitis A 1995    Osteoarthritis 1990    Reflex sympathetic dystrophy 1997    RSD (reflex sympathetic dystrophy) 1992     Past Surgical History:   Procedure Laterality Date    ANKLE FUSION Left 2011    x 3. Previously done in 1997 and 1999.    ANKLE SURGERY Left 1998    screw removal due to allergy to titanium.    ANKLE SURGERY Left 1992    Payne Procedure on left ankle.    CERVICAL FUSION  1985    CHOLECYSTECTOMY  2013    Due to Gallstones.    COLONOSCOPY      COLONOSCOPY N/A 12/29/2022    Procedure: COLONOSCOPY;  Surgeon:  Sonia Hook MD;  Location: [facility] ENDO;  Service: Endoscopy;  Laterality: N/A;    DILATION AND CURETTAGE OF UTERUS  1981    Due to miscarriage.    ESOPHAGOGASTRODUODENOSCOPY N/A 12/22/2022    Procedure: EGD (ESOPHAGOGASTRODUODENOSCOPY);  Surgeon: Sonia Hook MD;  Location: [facility] ENDO;  Service: Endoscopy;  Laterality: N/A;    LA CALCANEAL OSTEOTOMY  1992    HAND SURGERY Left 1978    LEG SURGERY Bilateral 1976    LEG SURGERY Bilateral 1977    staple removal    LUMBAR SYMPATHETIC NERVE BLOCK  1997, 1998, 1999, 2000, 2021    multiple    REPAIR OF MENISCUS OF KNEE Right 1998    SURGICAL REMOVAL OF BONE SPUR Left 1992    Ankle.    TENDON REPAIR Right 2019    ankle    UPPER GASTROINTESTINAL ENDOSCOPY       Family History   Problem Relation Age of Onset    Schrader's esophagus Mother     Breast cancer Paternal Aunt     Cancer Paternal Grandmother         ovarian cancer    Colon cancer Paternal Grandmother     Colon polyps Neg Hx     Stomach cancer Neg Hx      Social History     Tobacco Use    Smoking status: Never    Smokeless tobacco: Never   Substance Use Topics    Alcohol use: Never    Drug use: Never     Review of Systems   All other systems reviewed and are negative.      Physical Exam     Initial Vitals [08/13/23 1644]   BP Pulse Resp Temp SpO2   (!) 143/75 69 18 98.7 °F (37.1 °C) 95 %      MAP       --         Physical Exam    Nursing note and vitals reviewed.  Constitutional: She appears well-developed and well-nourished.   Pleasant, polite   HENT:   Head: Normocephalic and atraumatic.   Mouth/Throat: Oropharynx is clear and moist.   Eyes: EOM are normal.   Neck: Neck supple.   Normal range of motion.  Cardiovascular:  Normal rate, regular rhythm, normal heart sounds and intact distal pulses.           Pulmonary/Chest: Breath sounds normal. No respiratory distress.   Abdominal: Abdomen is soft.   Musculoskeletal:      Cervical back: Normal range of motion and neck supple.     Neurological: She is alert  and oriented to person, place, and time.   Skin: Skin is warm and dry. Capillary refill takes less than 2 seconds.   Psychiatric: She has a normal mood and affect. Her behavior is normal. Judgment and thought content normal.         ED Course   Procedures  Labs Reviewed   POCT GLUCOSE - Abnormal; Notable for the following components:       Result Value    POC Glucose 113 (*)     All other components within normal limits   POCT GLUCOSE MONITORING CONTINUOUS          Imaging Results              X-Ray Chest AP Portable (In process)                      Medications - No data to display  Medical Decision Making:   Initial Assessment:   Patient is in no distress  Differential Diagnosis:   Considerations include but are not limited to COVID pneumonia, COVID illness, pulmonary embolism  ED Management:  Patient's exam is consistent with COVID illness she appears fatigued but there is no tachypnea or difficulty breathing.  Her pulse oximetry is within normal limits there is no tachycardia.  Patient's chest x-ray is within normal limits.  Accu-Chek is acceptable.  At 113.  I encouraged patient to continue her Paxlovid  it and to additionally take zinc daily.  I gave her detailed return precautions.  Patient be discharged in stable condition.                          Clinical Impression:   Final diagnoses:  [U07.1] COVID-19        ED Disposition Condition    Discharge Stable          ED Prescriptions    None       Follow-up Information       Follow up With Specialties Details Why Contact Info    Ervin Garnica MD Family Medicine Schedule an appointment as soon as possible for a visit in 2 days  [address] [facility] [address] [place] [address]  [phone]               Enrique Arellano MD  08/13/23 4714

## 2023-09-06 ENCOUNTER — PATIENT OUTREACH (OUTPATIENT)
Dept: ADMINISTRATIVE | Facility: HOSPITAL | Age: 63
End: 2023-09-06
Payer: COMMERCIAL

## 2023-09-06 ENCOUNTER — PATIENT MESSAGE (OUTPATIENT)
Dept: ADMINISTRATIVE | Facility: HOSPITAL | Age: 63
End: 2023-09-06
Payer: COMMERCIAL

## 2023-09-06 DIAGNOSIS — Z12.31 OTHER SCREENING MAMMOGRAM: ICD-10-CM

## 2023-09-06 DIAGNOSIS — E11.9 DIABETES MELLITUS WITHOUT COMPLICATION: ICD-10-CM

## 2023-09-21 ENCOUNTER — PATIENT MESSAGE (OUTPATIENT)
Dept: ADMINISTRATIVE | Facility: HOSPITAL | Age: 63
End: 2023-09-21
Payer: COMMERCIAL

## 2023-10-10 DIAGNOSIS — E11.9 TYPE 2 DIABETES MELLITUS WITHOUT COMPLICATION, WITHOUT LONG-TERM CURRENT USE OF INSULIN: ICD-10-CM

## 2023-10-10 RX ORDER — METFORMIN HYDROCHLORIDE 500 MG/1
TABLET ORAL
Qty: 180 TABLET | Refills: 3 | Status: SHIPPED | OUTPATIENT
Start: 2023-10-10

## 2023-10-10 NOTE — TELEPHONE ENCOUNTER
Care Due:                  Date            Visit Type   Department     Provider  --------------------------------------------------------------------------------                                EP -                              [facility] FAMILY  Last Visit: 05-      CARE ([facility])   ROCIO Garnica                              EP -                              PRIMARY      [facility] FAMILY  Next Visit: 11-      CARE ([facility])   MEDICINE       Ervin Garnica                                                            Last  Test          Frequency    Reason                     Performed    Due Date  --------------------------------------------------------------------------------    CMP.........  12 months..  losartan, metFORMIN,       08- 08-                             rosuvastatin.............    HBA1C.......  6 months...  metFORMIN................  01- 07-    Lipid Panel.  12 months..  rosuvastatin.............  07-   07-    [facility] Embedded Care Due Messages. Reference number: [id number].   10/10/2023 12:47:47 PM CDT

## 2023-10-18 ENCOUNTER — PATIENT OUTREACH (OUTPATIENT)
Dept: ADMINISTRATIVE | Facility: HOSPITAL | Age: 63
End: 2023-10-18
Payer: COMMERCIAL

## 2023-10-18 NOTE — PROGRESS NOTES
Population Health Chart Review & Patient Outreach Details:     Additional Care Coordinator Notes:      Reason for Outreach Encounter:     []  Non-Compliant Report   []  Payor Report (Hum[employer], PHN, BCBS, MSSP, MCIP, UHC, etc.)   [x]  Chart Review   []  Weekly Bulk Order Report- Order placed               []  Epic Campaign     Updates Requested / Reviewed:     [x]  Care Everywhere    [x]     []  External Sources ([employer], [employer]),               []  External Source DIS              []  External Source [place]   []  Care Team Updated    Patient Outreach Method:      []  Telephone Outreach Completed   [] Successful   [] Left Voicemail   [] Unable to Contact (wrong number, no voicemail)   [] [facility] Outreach Sent  [x]  Letter Outreach Mailed        Health Maintenance Topics Addressed and Outreach Outcomes / Actions Taken:        [x]      Breast Cancer Screening   []  Mammo Scheduled      []  External Records Requested     []  Patient Declined     []  Patient Will Call Back to Schedule     []  Patient Will Schedule with External Provider / Order Routed if Applicable     [] MAMMOGRAM ORDERED     [] External Records Uploaded             []       Cervical Cancer Screening []  Pap Scheduled      []  External Records Requested     []  Patient Declined     []  Patient Will Call Back to Schedule     []  Patient Will Schedule with External Provider     [] External Records Uploaded               []          Colorectal Cancer Screening [] Colonoscopy Case Request or Referral Placed     []  External Records Requested     []  Patient Declined     []  Patient Will Call Back to Schedule     []  Patient Will Schedule with External Provider     []  Fit Kit Mailed (add the SmartPhrase under additional notes)     []  Reminded Patient to Complete Home Test     [] FIT KIT ORDERED     [] External Records Uploaded             [x]      Diabetic Eye Exam []  Eye Camera Scheduled or Optometry Referral Placed     []  External  Records Requested     []  Patient Declined     []  Patient Will Call Back to Schedule     []  Patient Will Schedule with External Provider     [] External Records Uploaded             []      Blood Pressure Control []  Primary Care Follow Up Visit Scheduled     []  Remote Blood Pressure Reading Captured     []  Patient Declined     []  Patient Will Call Back / Patient Will Send Portal Message with Reading     []  Patient Will Call Back to Schedule Provider Visit             [x]       HbA1c & Other Labs []  Lab Appt Scheduled for Due Labs     []  Primary Care Follow Up Visit Scheduled      []  Reminded Patient to Complete Home Test     []  Patient Declined     []  Patient Will Call Back to Schedule     []  Patient Will Schedule with External Provider / Order Routed if Applicable     [] LABS ORDERED     [] External Records Uploaded           []    Schedule Primary Care Appt []  Primary Care Appt Scheduled     []  Patient Declined     []  Patient Will Call Back to Schedule     []  Pt Established with External Provider & Updated Care Team             []      Medication Adherence []  Primary Care Appointment Scheduled     []  Reminder Added to Upcoming Primary Care Appt Notes     []  Patient Reminded to  Prescription     []  Patient Declined, Provider Notified if Needed     []  Sent Provider Message to Review and/or Add Exclusion to Problem List             []      Osteoporosis Screening []  DXA Appointment Scheduled     []  External Records Requested     []  Patient Declined     []  Patient Will Call Back to Schedule     []  Patient Will Schedule with External Provider / Order Routed if Applicable     [] DEXA ORDERED     [] External Records Uploaded

## 2023-10-18 NOTE — LETTER
October 18, 2023    Daina Britt  [address] [place] [address]             [facility]  [address]  [facility] [address]  Phone: [phone] Dear Sharon, [facility] is committed to your overall health.  To help you get the most out of each of your visits, we will review your information to make sure you are up to date on all of your recommended tests and/or procedures.      Ervin Garnica MD  has found that your chart shows you may be due for the following:    Health Maintenance Due   Topic    Foot Exam     Low Dose Statin     Eye Exam     Lipid Panel     Hemoglobin A1c     Mammogram     Influenza Vaccine (1)        If you have had any of the above done at another facility, please bring the records with you or Fax them to [phone] so that your record at [facility] will be complete. If you have not had any of these tests or procedures done recently and would like to complete this testing ,  please call [phone] or send a message through your [facility] portal to your provider's office.     If you have an upcoming scheduled appointment for the above test and/or procedures, please disregard this letter.    Thank you for letting us care for you,    Concepcion Bruce, Care Coordinator  [facility]  Phone: [phone]  Fax: [phone]

## 2023-11-02 ENCOUNTER — PATIENT MESSAGE (OUTPATIENT)
Dept: ADMINISTRATIVE | Facility: HOSPITAL | Age: 63
End: 2023-11-02
Payer: COMMERCIAL

## 2023-11-02 ENCOUNTER — LAB VISIT (OUTPATIENT)
Dept: LAB | Facility: HOSPITAL | Age: 63
End: 2023-11-02
Payer: COMMERCIAL

## 2023-11-02 ENCOUNTER — OFFICE VISIT (OUTPATIENT)
Dept: FAMILY MEDICINE | Facility: CLINIC | Age: 63
End: 2023-11-02
Payer: COMMERCIAL

## 2023-11-02 VITALS
BODY MASS INDEX: 30.56 KG/M2 | SYSTOLIC BLOOD PRESSURE: 130 MMHG | HEIGHT: 65 IN | DIASTOLIC BLOOD PRESSURE: 84 MMHG | WEIGHT: 183.44 LBS

## 2023-11-02 DIAGNOSIS — E11.9 DIABETES MELLITUS WITHOUT COMPLICATION: ICD-10-CM

## 2023-11-02 DIAGNOSIS — I73.00 RAYNAUD'S DISEASE WITHOUT GANGRENE: ICD-10-CM

## 2023-11-02 DIAGNOSIS — E11.9 TYPE 2 DIABETES MELLITUS WITHOUT COMPLICATION: ICD-10-CM

## 2023-11-02 DIAGNOSIS — E78.2 MIXED HYPERLIPIDEMIA: ICD-10-CM

## 2023-11-02 DIAGNOSIS — E11.9 TYPE 2 DIABETES MELLITUS WITHOUT COMPLICATION, WITHOUT LONG-TERM CURRENT USE OF INSULIN: Primary | ICD-10-CM

## 2023-11-02 DIAGNOSIS — I10 ESSENTIAL HYPERTENSION: ICD-10-CM

## 2023-11-02 LAB
ALBUMIN SERPL BCP-MCNC: 4 G/DL (ref 3.5–5.2)
ALP SERPL-CCNC: 111 U/L (ref 55–135)
ALT SERPL W/O P-5'-P-CCNC: 26 U/L (ref 10–44)
ANION GAP SERPL CALC-SCNC: 11 MMOL/L (ref 8–16)
AST SERPL-CCNC: 20 U/L (ref 10–40)
BILIRUB SERPL-MCNC: 0.4 MG/DL (ref 0.1–1)
BUN SERPL-MCNC: 12 MG/DL (ref 8–23)
CALCIUM SERPL-MCNC: 9.5 MG/DL (ref 8.7–10.5)
CHLORIDE SERPL-SCNC: 107 MMOL/L (ref 95–110)
CHOLEST SERPL-MCNC: 121 MG/DL (ref 120–199)
CHOLEST/HDLC SERPL: 2.3 {RATIO} (ref 2–5)
CO2 SERPL-SCNC: 25 MMOL/L (ref 23–29)
CREAT SERPL-MCNC: 0.7 MG/DL (ref 0.5–1.4)
EST. GFR  (NO RACE VARIABLE): >60 ML/MIN/1.73 M^2
ESTIMATED AVG GLUCOSE: 137 MG/DL (ref 68–131)
GLUCOSE SERPL-MCNC: 131 MG/DL (ref 70–110)
HBA1C MFR BLD: 6.4 % (ref 4–5.6)
HDLC SERPL-MCNC: 53 MG/DL (ref 40–75)
HDLC SERPL: 43.8 % (ref 20–50)
LDLC SERPL CALC-MCNC: 45 MG/DL (ref 63–159)
NONHDLC SERPL-MCNC: 68 MG/DL
POTASSIUM SERPL-SCNC: 4.5 MMOL/L (ref 3.5–5.1)
PROT SERPL-MCNC: 6.7 G/DL (ref 6–8.4)
SODIUM SERPL-SCNC: 143 MMOL/L (ref 136–145)
TRIGL SERPL-MCNC: 115 MG/DL (ref 30–150)

## 2023-11-02 PROCEDURE — 99214 PR OFFICE/OUTPT VISIT, EST, LEVL IV, 30-39 MIN: ICD-10-PCS | Mod: S$GLB,,, | Performed by: FAMILY MEDICINE

## 2023-11-02 PROCEDURE — 3075F PR MOST RECENT SYSTOLIC BLOOD PRESS GE 130-139MM HG: ICD-10-PCS | Mod: CPTII,S$GLB,, | Performed by: FAMILY MEDICINE

## 2023-11-02 PROCEDURE — 3008F PR BODY MASS INDEX (BMI) DOCUMENTED: ICD-10-PCS | Mod: CPTII,S$GLB,, | Performed by: FAMILY MEDICINE

## 2023-11-02 PROCEDURE — 36415 COLL VENOUS BLD VENIPUNCTURE: CPT | Mod: PO | Performed by: FAMILY MEDICINE

## 2023-11-02 PROCEDURE — 3066F PR DOCUMENTATION OF TREATMENT FOR NEPHROPATHY: ICD-10-PCS | Mod: CPTII,S$GLB,, | Performed by: FAMILY MEDICINE

## 2023-11-02 PROCEDURE — 99214 OFFICE O/P EST MOD 30 MIN: CPT | Mod: S$GLB,,, | Performed by: FAMILY MEDICINE

## 2023-11-02 PROCEDURE — 3079F DIAST BP 80-89 MM HG: CPT | Mod: CPTII,S$GLB,, | Performed by: FAMILY MEDICINE

## 2023-11-02 PROCEDURE — 4010F ACE/ARB THERAPY RXD/TAKEN: CPT | Mod: CPTII,S$GLB,, | Performed by: FAMILY MEDICINE

## 2023-11-02 PROCEDURE — 99999 PR PBB SHADOW E&M-EST. PATIENT-LVL III: ICD-10-PCS | Mod: PBBFAC,,, | Performed by: FAMILY MEDICINE

## 2023-11-02 PROCEDURE — 3044F PR MOST RECENT HEMOGLOBIN A1C LEVEL <7.0%: ICD-10-PCS | Mod: CPTII,S$GLB,, | Performed by: FAMILY MEDICINE

## 2023-11-02 PROCEDURE — 1160F RVW MEDS BY RX/DR IN RCRD: CPT | Mod: CPTII,S$GLB,, | Performed by: FAMILY MEDICINE

## 2023-11-02 PROCEDURE — 1159F MED LIST DOCD IN RCRD: CPT | Mod: CPTII,S$GLB,, | Performed by: FAMILY MEDICINE

## 2023-11-02 PROCEDURE — 80053 COMPREHEN METABOLIC PANEL: CPT | Performed by: FAMILY MEDICINE

## 2023-11-02 PROCEDURE — 3044F HG A1C LEVEL LT 7.0%: CPT | Mod: CPTII,S$GLB,, | Performed by: FAMILY MEDICINE

## 2023-11-02 PROCEDURE — 99999 PR PBB SHADOW E&M-EST. PATIENT-LVL III: CPT | Mod: PBBFAC,,, | Performed by: FAMILY MEDICINE

## 2023-11-02 PROCEDURE — 3061F PR NEG MICROALBUMINURIA RESULT DOCUMENTED/REVIEW: ICD-10-PCS | Mod: CPTII,S$GLB,, | Performed by: FAMILY MEDICINE

## 2023-11-02 PROCEDURE — 4010F PR ACE/ARB THEARPY RXD/TAKEN: ICD-10-PCS | Mod: CPTII,S$GLB,, | Performed by: FAMILY MEDICINE

## 2023-11-02 PROCEDURE — 3075F SYST BP GE 130 - 139MM HG: CPT | Mod: CPTII,S$GLB,, | Performed by: FAMILY MEDICINE

## 2023-11-02 PROCEDURE — 80061 LIPID PANEL: CPT | Performed by: FAMILY MEDICINE

## 2023-11-02 PROCEDURE — 1160F PR REVIEW ALL MEDS BY PRESCRIBER/CLIN PHARMACIST DOCUMENTED: ICD-10-PCS | Mod: CPTII,S$GLB,, | Performed by: FAMILY MEDICINE

## 2023-11-02 PROCEDURE — 3061F NEG MICROALBUMINURIA REV: CPT | Mod: CPTII,S$GLB,, | Performed by: FAMILY MEDICINE

## 2023-11-02 PROCEDURE — 83036 HEMOGLOBIN GLYCOSYLATED A1C: CPT | Performed by: FAMILY MEDICINE

## 2023-11-02 PROCEDURE — 3066F NEPHROPATHY DOC TX: CPT | Mod: CPTII,S$GLB,, | Performed by: FAMILY MEDICINE

## 2023-11-02 PROCEDURE — 1159F PR MEDICATION LIST DOCUMENTED IN MEDICAL RECORD: ICD-10-PCS | Mod: CPTII,S$GLB,, | Performed by: FAMILY MEDICINE

## 2023-11-02 PROCEDURE — 3079F PR MOST RECENT DIASTOLIC BLOOD PRESSURE 80-89 MM HG: ICD-10-PCS | Mod: CPTII,S$GLB,, | Performed by: FAMILY MEDICINE

## 2023-11-02 PROCEDURE — 3008F BODY MASS INDEX DOCD: CPT | Mod: CPTII,S$GLB,, | Performed by: FAMILY MEDICINE

## 2023-11-02 RX ORDER — NIRMATRELVIR AND RITONAVIR 300-100 MG
KIT ORAL
COMMUNITY
Start: 2023-08-10 | End: 2023-11-02

## 2023-11-02 RX ORDER — ZINC GLUCONATE 50 MG
50 TABLET ORAL DAILY
COMMUNITY

## 2023-11-02 RX ORDER — CIPROFLOXACIN 500 MG/1
500 TABLET ORAL EVERY 12 HOURS
COMMUNITY
Start: 2023-07-07 | End: 2023-11-02

## 2023-11-02 RX ORDER — METHYLPREDNISOLONE 4 MG/1
TABLET ORAL
COMMUNITY
Start: 2023-07-07 | End: 2023-11-02

## 2023-11-02 NOTE — PROGRESS NOTES
"Subjective:       Patient ID: Dania Britt is a 63 y.o. female.    Chief Complaint: Follow-up (Follow up )    HPI:  Pleasant 63-year-old patient here for follow-up.  She just had some labs drawn they will be done by this afternoon hopefully.  She has type 2 diabetes which is controlled typically.  She is also being treated for hypertension and mixed hyperlipidemia.  Having no symptoms that are concerning for CAD or CHF.  Foot examination today was within normal limits with good pulses.  Eye examination was completed this past January at [facility].  Thirty BMI she should drop about 15 lb or so.    Review of Systems   Constitutional: Negative.    HENT: Negative.     Eyes: Negative.    Respiratory: Negative.     Cardiovascular: Negative.    Gastrointestinal: Negative.    Endocrine: Negative.    Genitourinary: Negative.    Musculoskeletal: Negative.    Skin: Negative.    Allergic/Immunologic: Negative.    Neurological: Negative.    Hematological: Negative.    Psychiatric/Behavioral: Negative.         Objective:      Vitals:    11/02/23 1011   BP: 130/84   BP Location: Left arm   Patient Position: Sitting   BP Method: Large (Manual)   Weight: 83.2 kg (183 lb 6.8 oz)   Height: 5' 5" (1.651 m)      Physical Exam  Vitals and nursing note reviewed.   Constitutional:       Appearance: Normal appearance. She is normal weight.   HENT:      Head: Normocephalic and atraumatic.      Nose: Nose normal.      Mouth/Throat:      Mouth: Mucous membranes are moist.      Pharynx: Oropharynx is clear.   Eyes:      Extraocular Movements: Extraocular movements intact.      Conjunctiva/sclera: Conjunctivae normal.      Pupils: Pupils are equal, round, and reactive to light.   Cardiovascular:      Rate and Rhythm: Normal rate and regular rhythm.      Pulses: Normal pulses.      Heart sounds: Normal heart sounds.   Pulmonary:      Effort: Pulmonary effort is normal.      Breath sounds: Normal breath sounds.   Abdominal:      General: Abdomen " is flat. Bowel sounds are normal.      Palpations: Abdomen is soft.   Musculoskeletal:         General: Normal range of motion.      Cervical back: Normal range of motion and neck supple.   Skin:     General: Skin is warm and dry.      Capillary Refill: Capillary refill takes less than 2 seconds.   Neurological:      General: No focal deficit present.      Mental Status: She is alert and oriented to person, place, and time. Mental status is at baseline.   Psychiatric:         Mood and Affect: Mood normal.         Behavior: Behavior normal.         Thought Content: Thought content normal.         Judgment: Judgment normal.         Results for orders placed or performed during the hospital encounter of 08/13/23   POCT glucose   Result Value Ref Range    POC Glucose 113 (H) 70 - 110      Assessment:       1. Type 2 diabetes mellitus without complication, without long-term current use of insulin    2. Essential hypertension    3. Mixed hyperlipidemia    4. Raynaud's disease without gangrene        Plan:       Type 2 diabetes mellitus without complication, without long-term current use of insulin  Comments:  Controlled without sequelae.    Essential hypertension  Comments:  Controlled continue same plan.    Mixed hyperlipidemia  Comments:  Continue on statin.  Labs pending.    Raynaud's disease without gangrene  Comments:  It just during cold that it is back.  Keep her hands and feet warm.          Medication List with Changes/Refills   Current Medications    AMLODIPINE (NORVASC) 5 MG TABLET    Take 1 tablet (5 mg total) by mouth once daily.    ASPIRIN (ECOTRIN) 81 MG EC TABLET    Take 81 mg by mouth once daily. Take one daily    CARBOXYMETHYLCELLULOSE (REFRESH PLUS) 0.5 % DPET    1 drop 3 (three) times daily as needed.    FLUTICASONE PROPIONATE (FLONASE) 50 MCG/ACTUATION NASAL SPRAY    1 spray by Each Nostril route once daily.    IBUPROFEN-ACETAMINOPHEN (ADVIL DUAL ACTION) 125-250 MG TAB    Take by mouth every 8 (eight)  hours. Take 2 twice a day    LORATADINE (CLARITIN) 10 MG TABLET    Take 10 mg by mouth once daily.    LOSARTAN (COZAAR) 50 MG TABLET    Take 1 tablet by mouth once daily    MAG/ALUMINUM/SOD BICARB/ALGINC (GAVISCON ORAL)    Take 0.5 fluid ounces by mouth once daily.    METFORMIN (GLUCOPHAGE) 500 MG TABLET    Take 1 tablet by mouth twice daily    OMEPRAZOLE (PRILOSEC) 20 MG CAPSULE    Take 1 capsule by mouth twice daily    ROSUVASTATIN (CRESTOR) 20 MG TABLET    Take 1 tablet by mouth once daily    ZINC GLUCONATE 50 MG TABLET    Take 50 mg by mouth once daily.   Discontinued Medications    CIPROFLOXACIN HCL (CIPRO) 500 MG TABLET    Take 500 mg by mouth every 12 (twelve) hours.    METHYLPREDNISOLONE (MEDROL DOSEPACK) 4 MG TABLET    Take by mouth.    PAXLOVID 300 MG (150 MG X 2)-100 MG COPACKAGED TABLETS (EUA)    Take by mouth.

## 2024-01-14 DIAGNOSIS — I73.00 RAYNAUD'S DISEASE WITHOUT GANGRENE: ICD-10-CM

## 2024-01-14 DIAGNOSIS — I10 ESSENTIAL HYPERTENSION: ICD-10-CM

## 2024-01-14 DIAGNOSIS — E78.2 MIXED HYPERLIPIDEMIA: Primary | ICD-10-CM

## 2024-01-15 NOTE — TELEPHONE ENCOUNTER
No care due was identified.  [facility] Embedded Care Due Messages. Reference number: [id number].   1/14/2024 9:48:10 PM CST

## 2024-01-16 RX ORDER — AMLODIPINE BESYLATE 5 MG/1
5 TABLET ORAL
Qty: 90 TABLET | Refills: 3 | Status: SHIPPED | OUTPATIENT
Start: 2024-01-16

## 2024-01-16 RX ORDER — ROSUVASTATIN CALCIUM 20 MG/1
20 TABLET, COATED ORAL DAILY
Qty: 90 TABLET | Refills: 3 | Status: SHIPPED | OUTPATIENT
Start: 2024-01-16

## 2024-01-16 NOTE — TELEPHONE ENCOUNTER
Refill Routing Note   Medication(s) are not appropriate for processing by [facility] for the following reason(s):        No active prescription written by provider    [facility] action(s):  Defer  Approve               Appointments  past 12m or future 3m with PCP    Date Provider   Last Visit   11/2/2023 Ervin Garnica MD   Next Visit   Visit date not found Ervin Garnica MD   ED visits in past 90 days: 0        Note composed:9:59 AM 01/16/2024

## 2024-04-10 DIAGNOSIS — E11.9 TYPE 2 DIABETES MELLITUS WITHOUT COMPLICATION: ICD-10-CM

## 2024-04-22 ENCOUNTER — HOSPITAL ENCOUNTER (EMERGENCY)
Facility: HOSPITAL | Age: 64
Discharge: HOME | End: 2024-04-22
Attending: EMERGENCY MEDICINE
Payer: COMMERCIAL

## 2024-04-22 VITALS
RESPIRATION RATE: 16 BRPM | BODY MASS INDEX: 29.95 KG/M2 | DIASTOLIC BLOOD PRESSURE: 83 MMHG | OXYGEN SATURATION: 95 % | HEART RATE: 71 BPM | TEMPERATURE: 99 F | WEIGHT: 180 LBS | SYSTOLIC BLOOD PRESSURE: 190 MMHG

## 2024-04-22 DIAGNOSIS — M25.552 LEFT HIP PAIN: Primary | ICD-10-CM

## 2024-04-22 DIAGNOSIS — R52 PAIN: ICD-10-CM

## 2024-04-22 PROCEDURE — 25000003 PHARM REV CODE 250: Performed by: NURSE PRACTITIONER

## 2024-04-22 PROCEDURE — 99284 EMERGENCY DEPT VISIT MOD MDM: CPT | Mod: 25

## 2024-04-22 RX ORDER — DICLOFENAC SODIUM 50 MG/1
50 TABLET, DELAYED RELEASE ORAL 3 TIMES DAILY PRN
Qty: 20 TABLET | Refills: 2 | Status: SHIPPED | OUTPATIENT
Start: 2024-04-22

## 2024-04-22 RX ORDER — NAPROXEN 250 MG/1
500 TABLET ORAL
Status: COMPLETED | OUTPATIENT
Start: 2024-04-22 | End: 2024-04-22

## 2024-04-22 RX ADMIN — NAPROXEN 500 MG: 250 TABLET ORAL at 10:04

## 2024-04-22 NOTE — DISCHARGE INSTRUCTIONS
Taking medication as prescribed follow up with Dr. Chou or an orthopedist of your choice.  I have given you a list.  Return to the ED for any worsening symptoms or any other concerns.

## 2024-04-22 NOTE — ED PROVIDER NOTES
Encounter Date: 4/22/2024       History     Chief Complaint   Patient presents with    Hip Pain     Left hip pain, denies injury     Presents with left hip pain.  Onset 2 days.  Patient denies injury.  She denies history of same.  Patient reports taken ibuprofen with no relief.  She is allergic to multiple things that would help her hip pain.  She did drive herself here to the ED. she does have screws in the left ankle.      Review of patient's allergies indicates:   Allergen Reactions    Iodinated contrast media Other (See Comments)     Myelogram dye-causes seizures    Titanium analogues Swelling     Titanium screws     Cortisone Other (See Comments)     Cortisone injection  (RSDS)(CRPS)    Influenza virus vaccines      Guillian barre    Tetanus vaccines and toxoid      Guillian barre syndrome      Codeine Nausea And Vomiting and Rash    Darvon [propoxyphene] Nausea And Vomiting and Rash    Demerol [meperidine] Nausea And Vomiting and Rash    Opioids - morphine analogues Nausea And Vomiting and Rash    Paragoric Nausea And Vomiting and Rash    Wygesic Nausea And Vomiting and Rash     Past Medical History:   Diagnosis Date    Cellulitis of left ankle 1998    Chronic pain 1990    Chronic pain     Colon cancer 2016    Colon polyp     Concussion 1975    Diabetes 2010    Foot infection 1974    right    GERD (gastroesophageal reflux disease) 1992    Guillain Barré syndrome 1990    Hepatitis A 1995    Osteoarthritis 1990    Reflex sympathetic dystrophy 1997    RSD (reflex sympathetic dystrophy) 1992     Past Surgical History:   Procedure Laterality Date    ANKLE FUSION Left 2011    x 3. Previously done in 1997 and 1999.    ANKLE SURGERY Left 1998    screw removal due to allergy to titanium.    ANKLE SURGERY Left 1992    Payne Procedure on left ankle.    CERVICAL FUSION  1985    CHOLECYSTECTOMY  2013    Due to Gallstones.    COLONOSCOPY      COLONOSCOPY N/A 12/29/2022    Procedure: COLONOSCOPY;  Surgeon: Sonia Hook  MD;  Location: [facility] ENDO;  Service: Endoscopy;  Laterality: N/A;    DILATION AND CURETTAGE OF UTERUS  1981    Due to miscarriage.    ESOPHAGOGASTRODUODENOSCOPY N/A 12/22/2022    Procedure: EGD (ESOPHAGOGASTRODUODENOSCOPY);  Surgeon: Sonia Hook MD;  Location: [facility] ENDO;  Service: Endoscopy;  Laterality: N/A;    LA CALCANEAL OSTEOTOMY  1992    HAND SURGERY Left 1978    LEG SURGERY Bilateral 1976    LEG SURGERY Bilateral 1977    staple removal    LUMBAR SYMPATHETIC NERVE BLOCK  1997, 1998, 1999, 2000, 2021    multiple    REPAIR OF MENISCUS OF KNEE Right 1998    SURGICAL REMOVAL OF BONE SPUR Left 1992    Ankle.    TENDON REPAIR Right 2019    ankle    UPPER GASTROINTESTINAL ENDOSCOPY       Family History   Problem Relation Name Age of Onset    Schrader's esophagus Mother      Breast cancer Paternal Aunt      Cancer Paternal Grandmother          ovarian cancer    Colon cancer Paternal Grandmother      Colon polyps Neg Hx      Stomach cancer Neg Hx       Social History     Tobacco Use    Smoking status: Never    Smokeless tobacco: Never   Substance Use Topics    Alcohol use: Never    Drug use: Never     Review of Systems   Constitutional:  Negative for fever.   Respiratory:  Negative for cough, shortness of breath and wheezing.    Cardiovascular:  Negative for chest pain, palpitations and leg swelling.   Gastrointestinal:  Negative for abdominal pain, diarrhea, nausea and vomiting.   Musculoskeletal:  Positive for gait problem. Negative for back pain, joint swelling and neck pain.        Left hip pain.   Skin:  Negative for rash.   Neurological:  Negative for weakness.       Physical Exam     Initial Vitals [04/22/24 0433]   BP Pulse Resp Temp SpO2   (!) 190/83 71 16 98.5 °F (36.9 °C) 95 %      MAP       --         Physical Exam    Constitutional: She appears well-developed and well-nourished.   HENT:   Head: Normocephalic.   Mouth/Throat: Oropharynx is clear and moist.   Eyes: Conjunctivae are normal.   Neck:  Neck supple.   Normal range of motion.  Cardiovascular:  Normal rate, regular rhythm and normal heart sounds.           Pulmonary/Chest: Breath sounds normal. No respiratory distress. She has no wheezes.   Musculoskeletal:         General: Tenderness present. Normal range of motion.      Cervical back: Normal range of motion and neck supple.      Comments: Moderate tenderness to the joint of the left hip.  This has with palpation.  Patient has full range of motion.  Flexion greatly increases her pain.  She is using 2 canes for ambulation.  Weight-bearing increases her pain.  There is no exterior evidence of trauma IE swelling redness abrasion etcetera     Neurological: She is alert and oriented to person, place, and time. No sensory deficit. GCS score is 15. GCS eye subscore is 4. GCS verbal subscore is 5. GCS motor subscore is 6.   Skin: Skin is warm and dry. Capillary refill takes less than 2 seconds.   Psychiatric: She has a normal mood and affect. Thought content normal.         ED Course   Procedures  Labs Reviewed - No data to display       Imaging Results              CT Pelvis Without Contrast (Final result)  Result time 04/22/24 07:05:05      Final result by Lucien Cabrera MD (04/22/24 07:05:05)                   Impression:      1. No evidence of acute fracture or dislocation.  2. Partially imaged left adnexal partially calcified cystic lesion, possibly indeterminate right ovarian cyst/lesion.  Further characterization with ultrasound would be recommended, if this lesion has not been previously evaluated elsewhere.      Electronically signed by: Lucien Cabrera  Date:    04/22/2024  Time:    07:05               Narrative:    EXAMINATION:  CT PELVIS WITHOUT CONTRAST    CLINICAL HISTORY:  rule out hip fracture;.    COMPARISON:  None.    TECHNIQUE:  Unenhanced CT imaging of the bony pelvis was performed multiplanar reformats.    FINDINGS:  Bones: No definite evidence of acute fracture or dislocation.   Well corticated defect of the anterior right iliac wing may reflect prior bone graft harvest site.  Relatively modest degenerative change at the spine, sacroiliac joints, pubic symphysis, and both hip joints.  Modest enthesopathic change about the proximal femora    Soft tissue: Small fat containing umbilical hernia.    Bowel: No evidence of obstruction. Normal appendix.  Colonic diverticulosis.    Reproductive organs/bladder: Partially imaged multilobulated peripherally calcified lesion in the left adnexa posterior to the uterus measuring on the order of 22 x 25 x 12 mm (axial series 4, image 62; coronal reformat series 6, image 110).    Peritoneum/Retroperitoneum: No evidence of free fluid. No evidence of free air.    Lymph nodes: No evidence of lymphadenopathy.    Vessels: Mild atherosclerotic calcifications of the iliac and femoral arteries.                                       X-Ray Hip 2 or 3 views Left (with Pelvis when performed) (Final result)  Result time 04/22/24 05:28:55      Final result by Estella Sheikh MD (04/22/24 05:28:55)                   Impression:      Please see above.      Electronically signed by: Estella Sheikh MD  Date:    04/22/2024  Time:    05:28               Narrative:    EXAMINATION:  XR HIP WITH PELVIS WHEN PERFORMED, 2 OR 3 VIEWS LEFT    CLINICAL HISTORY:  Pain, unspecified    TECHNIQUE:  AP view of the pelvis and frog leg lateral view of the left hip were performed.    COMPARISON:  None    FINDINGS:  The visualized osseous and articular structures are intact.  The bilateral femoral heads appear well seated within the acetabula without evidence of dislocation.  The ilioischial and iliopectineal lines appear maintained.There is no significant pubic symphyseal or sacroiliac joint diastasis.The sacrum is partially obscured by overlying bowel gas. There are well corticated ossific densities along the greater trochanter which could reflect degenerative change or calcific  tendinopathy.                                       Medications   naproxen tablet 500 mg (has no administration in time range)     Medical Decision Making  Presents with left hip pain.  Onset 2 days.  Denies injury.  Patient reports she is taken ibuprofen with no relief.    Amount and/or Complexity of Data Reviewed  Radiology: ordered.     Details: X-rays negative with the exception degenerative changes.  CT is negative with the exception of degenerative changes and a right ovarian cyst.  Discussion of management or test interpretation with external provider(s): Patient was given naproxen 500 mg p.o. here in the ED. she has been instructed to follow up with an orthopedist.  I have given her Dr. Chou's name.  She has been given a prescription for diclofenac.  At discharge she appeared to be in no acute distress.  She is driving herself home.  I have wheel her out to her car.    Risk  Prescription drug management.                                      Clinical Impression:  Final diagnoses:  [R52] Pain  [M25.552] Left hip pain (Primary)          ED Disposition Condition    Discharge Stable          ED Prescriptions       Medication Sig Dispense Start Date End Date Auth. Provider    diclofenac (VOLTAREN) 50 MG EC tablet Take 1 tablet (50 mg total) by mouth 3 (three) times daily as needed. 20 tablet 4/22/2024 -- Susy Younger NP          Follow-up Information       Follow up With Specialties Details Why Contact Info    Shane Chou MD Orthopedic Surgery, Surgery, Sports Medicine In 2 days  [address]  [place] [address]  [phone]               Susy Younger NP  04/22/24 4425

## 2024-04-22 NOTE — ED PROVIDER NOTES
Encounter Date: 4/22/2024       History     Chief Complaint   Patient presents with    Hip Pain     Left hip pain, denies injury     Chief complaint is left hip pain.  No history of trauma.  The patient has a history of RSD and has chronic pain to the back hips knees and ankle.  At 9:00 p.m. tonight while lying on the couch or rather sitting on the couch with the legs extended she noticed severe pain to her left hip.  No history of fall or trauma.  She is a very sweet lady who can not take narcotics and normally takes 2 dual action Advil twice daily for her pain.  She is very sweet very nice vital signs stable blood pressure slightly elevated at the present time.        Review of patient's allergies indicates:   Allergen Reactions    Iodinated contrast media Other (See Comments)     Myelogram dye-causes seizures    Titanium analogues Swelling     Titanium screws     Cortisone Other (See Comments)     Cortisone injection  (RSDS)(CRPS)    Influenza virus vaccines      Guillian barre    Tetanus vaccines and toxoid      Guillian barre syndrome      Codeine Nausea And Vomiting and Rash    Darvon [propoxyphene] Nausea And Vomiting and Rash    Demerol [meperidine] Nausea And Vomiting and Rash    Opioids - morphine analogues Nausea And Vomiting and Rash    Paragoric Nausea And Vomiting and Rash    Wygesic Nausea And Vomiting and Rash     Past Medical History:   Diagnosis Date    Cellulitis of left ankle 1998    Chronic pain 1990    Chronic pain     Colon cancer 2016    Colon polyp     Concussion 1975    Diabetes 2010    Foot infection 1974    right    GERD (gastroesophageal reflux disease) 1992    Guillain Barré syndrome 1990    Hepatitis A 1995    Osteoarthritis 1990    Reflex sympathetic dystrophy 1997    RSD (reflex sympathetic dystrophy) 1992     Past Surgical History:   Procedure Laterality Date    ANKLE FUSION Left 2011    x 3. Previously done in 1997 and 1999.    ANKLE SURGERY Left 1998    screw removal due to  allergy to titanium.    ANKLE SURGERY Left 1992    Payne Procedure on left ankle.    CERVICAL FUSION  1985    CHOLECYSTECTOMY  2013    Due to Gallstones.    COLONOSCOPY      COLONOSCOPY N/A 12/29/2022    Procedure: COLONOSCOPY;  Surgeon: Sonia Hook MD;  Location: [facility] ENDO;  Service: Endoscopy;  Laterality: N/A;    DILATION AND CURETTAGE OF UTERUS  1981    Due to miscarriage.    ESOPHAGOGASTRODUODENOSCOPY N/A 12/22/2022    Procedure: EGD (ESOPHAGOGASTRODUODENOSCOPY);  Surgeon: Sonia Hook MD;  Location: [facility] ENDO;  Service: Endoscopy;  Laterality: N/A;    BESSIE CALCANEAL OSTEOTOMY  1992    HAND SURGERY Left 1978    LEG SURGERY Bilateral 1976    LEG SURGERY Bilateral 1977    staple removal    LUMBAR SYMPATHETIC NERVE BLOCK  1997, 1998, 1999, 2000, 2021    multiple    REPAIR OF MENISCUS OF KNEE Right 1998    SURGICAL REMOVAL OF BONE SPUR Left 1992    Ankle.    TENDON REPAIR Right 2019    ankle    UPPER GASTROINTESTINAL ENDOSCOPY       Family History   Problem Relation Name Age of Onset    Schrader's esophagus Mother      Breast cancer Paternal Aunt      Cancer Paternal Grandmother          ovarian cancer    Colon cancer Paternal Grandmother      Colon polyps Neg Hx      Stomach cancer Neg Hx       Social History     Tobacco Use    Smoking status: Never    Smokeless tobacco: Never   Substance Use Topics    Alcohol use: Never    Drug use: Never     Review of Systems   Constitutional:  Negative for chills and fever.   HENT:  Negative for ear pain, rhinorrhea and sore throat.    Eyes:  Negative for pain and visual disturbance.   Respiratory:  Negative for cough and shortness of breath.    Cardiovascular:  Negative for chest pain and palpitations.   Gastrointestinal:  Negative for abdominal pain, constipation, diarrhea, nausea and vomiting.   Genitourinary:  Negative for dysuria, frequency, hematuria and urgency.   Musculoskeletal:  Negative for back pain, joint swelling and myalgias.        Left hip pain    Skin:  Negative for rash.   Neurological:  Negative for dizziness, seizures, weakness and headaches.   Psychiatric/Behavioral:  Negative for dysphoric mood. The patient is not nervous/anxious.        Physical Exam     Initial Vitals [04/22/24 0433]   BP Pulse Resp Temp SpO2   (!) 190/83 71 16 98.5 °F (36.9 °C) 95 %      MAP       --         Physical Exam    Nursing note and vitals reviewed.  Constitutional: She appears well-developed and well-nourished.   HENT:   Head: Normocephalic and atraumatic.   Eyes: Conjunctivae, EOM and lids are normal. Pupils are equal, round, and reactive to light.   Neck: Trachea normal. Neck supple. No thyroid mass and no thyromegaly present.   Normal range of motion.  Cardiovascular:  Normal rate, regular rhythm and normal heart sounds.           Pulmonary/Chest: Effort normal and breath sounds normal.   Abdominal: Abdomen is soft. There is no abdominal tenderness.   Musculoskeletal:         General: Normal range of motion.      Cervical back: Normal range of motion and neck supple.      Comments: Patient with pain palpation left outer hip.  Patient able to extend knee while sitting down without any problems.  No definite sciatica on exam.    Patient has pain to the left hip on internal and external rotation the pain is on the outer aspect of the hip     Neurological: She is alert and oriented to person, place, and time. She has normal strength and normal reflexes. No cranial nerve deficit or sensory deficit.   Skin: Skin is warm and dry.   Psychiatric: She has a normal mood and affect. Her speech is normal and behavior is normal. Judgment and thought content normal.         ED Course   Procedures  Labs Reviewed - No data to display       Imaging Results              X-Ray Hip 2 or 3 views Left (with Pelvis when performed) (In process)                      Medications - No data to display  Medical Decision Making  The patient here has a history of RSD with chronic pain to the joints  of the hips shoulders legs.  She was sitting on a couch had severe sudden pain left hip which is worse on internal external rotation.  Will get CT scan to rule out fracture.  Care the patient will be turned over to Dr. Strong at 6:00 a.m.    Amount and/or Complexity of Data Reviewed  Radiology: ordered.                                      Clinical Impression:  Final diagnoses:  [R52] Pain                 Shawna Kemp MD  04/22/24 0519

## 2024-04-25 ENCOUNTER — OFFICE VISIT (OUTPATIENT)
Dept: ORTHOPEDICS | Facility: CLINIC | Age: 64
End: 2024-04-25
Payer: COMMERCIAL

## 2024-04-25 VITALS — WEIGHT: 179.88 LBS | BODY MASS INDEX: 29.97 KG/M2 | HEIGHT: 65 IN

## 2024-04-25 DIAGNOSIS — M70.62 GREATER TROCHANTERIC BURSITIS, LEFT: Primary | ICD-10-CM

## 2024-04-25 PROCEDURE — 99204 OFFICE O/P NEW MOD 45 MIN: CPT | Mod: S$GLB,,, | Performed by: ORTHOPAEDIC SURGERY

## 2024-04-25 PROCEDURE — 3008F BODY MASS INDEX DOCD: CPT | Mod: CPTII,S$GLB,, | Performed by: ORTHOPAEDIC SURGERY

## 2024-04-25 PROCEDURE — 4010F ACE/ARB THERAPY RXD/TAKEN: CPT | Mod: CPTII,S$GLB,, | Performed by: ORTHOPAEDIC SURGERY

## 2024-04-25 PROCEDURE — 1159F MED LIST DOCD IN RCRD: CPT | Mod: CPTII,S$GLB,, | Performed by: ORTHOPAEDIC SURGERY

## 2024-04-25 PROCEDURE — 1160F RVW MEDS BY RX/DR IN RCRD: CPT | Mod: CPTII,S$GLB,, | Performed by: ORTHOPAEDIC SURGERY

## 2024-04-25 PROCEDURE — 99999 PR PBB SHADOW E&M-EST. PATIENT-LVL III: CPT | Mod: PBBFAC,,, | Performed by: ORTHOPAEDIC SURGERY

## 2024-04-25 RX ORDER — METHYLPREDNISOLONE 4 MG/1
TABLET ORAL
Qty: 1 EACH | Refills: 0 | Status: SHIPPED | OUTPATIENT
Start: 2024-04-25 | End: 2024-05-16

## 2024-04-25 RX ORDER — KETOROLAC TROMETHAMINE 10 MG/1
10 TABLET, FILM COATED ORAL EVERY 6 HOURS
Qty: 20 TABLET | Refills: 0 | Status: SHIPPED | OUTPATIENT
Start: 2024-04-25 | End: 2024-04-30

## 2024-04-25 NOTE — PROGRESS NOTES
CC:  61-year-old female presents for evaluation of left hip pain.  The patient has had an insidious onset of pain in the left hip that started several days ago.  She was seen in the emergency room for 02/20/2024.  She was given some NSAIDs there and told to follow up with Orthopedics.  When asked to localize the pain she points to the lateral aspect of her left hip.  She specifically denies left groin pain.    Past Medical History:   Diagnosis Date    Cellulitis of left ankle 1998    Chronic pain 1990    Chronic pain     Colon cancer 2016    Colon polyp     Concussion 1975    Diabetes 2010    Foot infection 1974    right    GERD (gastroesophageal reflux disease) 1992    Guillain Barré syndrome 1990    Hepatitis A 1995    Osteoarthritis 1990    Reflex sympathetic dystrophy 1997    RSD (reflex sympathetic dystrophy) 1992     Past Surgical History:   Procedure Laterality Date    ANKLE FUSION Left 2011    x 3. Previously done in 1997 and 1999.    ANKLE SURGERY Left 1998    screw removal due to allergy to titanium.    ANKLE SURGERY Left 1992    Payne Procedure on left ankle.    CERVICAL FUSION  1985    CHOLECYSTECTOMY  2013    Due to Gallstones.    COLONOSCOPY      COLONOSCOPY N/A 12/29/2022    Procedure: COLONOSCOPY;  Surgeon: Sonia Hook MD;  Location: [facility];  Service: Endoscopy;  Laterality: N/A;    DILATION AND CURETTAGE OF UTERUS  1981    Due to miscarriage.    ESOPHAGOGASTRODUODENOSCOPY N/A 12/22/2022    Procedure: EGD (ESOPHAGOGASTRODUODENOSCOPY);  Surgeon: Sonia Hook MD;  Location: [facility];  Service: Endoscopy;  Laterality: N/A;    BESSIE CALCANEAL OSTEOTOMY  1992    HAND SURGERY Left 1978    LEG SURGERY Bilateral 1976    LEG SURGERY Bilateral 1977    staple removal    LUMBAR SYMPATHETIC NERVE BLOCK  1997, 1998, 1999, 2000, 2021    multiple    REPAIR OF MENISCUS OF KNEE Right 1998    SURGICAL REMOVAL OF BONE SPUR Left 1992    Ankle.    TENDON REPAIR Right 2019    ankle    UPPER  GASTROINTESTINAL ENDOSCOPY         Current Outpatient Medications on File Prior to Visit   Medication Sig Dispense Refill    amLODIPine (NORVASC) 5 MG tablet Take 1 tablet by mouth once daily 90 tablet 3    aspirin (ECOTRIN) 81 MG EC tablet Take 81 mg by mouth once daily. Take one daily      carboxymethylcellulose (REFRESH PLUS) 0.5 % Dpet 1 drop 3 (three) times daily as needed.      diclofenac (VOLTAREN) 50 MG EC tablet Take 1 tablet (50 mg total) by mouth 3 (three) times daily as needed. 20 tablet 2    fluticasone propionate (FLONASE) 50 mcg/actuation nasal spray 1 spray by Each Nostril route once daily.      ibuprofen-acetaminophen (ADVIL DUAL ACTION) 125-250 mg Tab Take by mouth every 8 (eight) hours. Take 2 twice a day      loratadine (CLARITIN) 10 mg tablet Take 10 mg by mouth once daily.      losartan (COZAAR) 50 MG tablet Take 1 tablet by mouth once daily 90 tablet 2    mag/aluminum/sod bicarb/alginc (GAVISCON ORAL) Take 0.5 fluid ounces by mouth once daily.      metFORMIN (GLUCOPHAGE) 500 MG tablet Take 1 tablet by mouth twice daily 180 tablet 3    omeprazole (PRILOSEC) 20 MG capsule Take 1 capsule by mouth twice daily 180 capsule 3    rosuvastatin (CRESTOR) 20 MG tablet Take 1 tablet (20 mg total) by mouth once daily. 90 tablet 3    zinc gluconate 50 mg tablet Take 50 mg by mouth once daily.       No current facility-administered medications on file prior to visit.       ROS:    Constitution: Denies chills, fever, and sweats.  HENT: Denies headaches or blurry vision.  Cardiovascular: Denies chest pain or irregular heart beat.  Respiratory: Denies cough or shortness of breath.  Gastrointestinal: Denies abdominal pain, nausea, or vomiting.  Genitourinary:  Denies urinary incontinence, bladder and kidney issues  Musculoskeletal:  Denies muscle cramps.  Neurological: Denies dizziness or focal weakness.  Psychiatric/Behavioral: Normal mental status.  Hematologic/Lymphatic: Denies bleeding problem or easy  bruising/bleeding.  Skin: Denies rash or suspicious lesions.    Physical examination     Gen - No acute distress, well nourished, well groomed   Eyes - Extraoccular motions intact, pupils equally round and reactive to light and accommodation   ENT - normocephalic, atruamtic, oropharynx clear   Neck - Supple, no abnormal masses   Cardiovascular - regular rate and rhythm   Pulmonary - clear to auscultation bilaterally, no wheezes, ronchi, or rales   Abdomen - soft, non-tender, non-distended, positive bowel sounds   Psych - The patient is alert and oriented x3 with normal mood and affect    Examination of the Left Lower Extremity    Skin is intact throughout  Motor in intact EHL,FHL,TA,maria dolores  +2 DP/PT  Sensation LT intact D/P/1st    Examination of the Left Hip    C-Sign negative  Logroll negative  Stenchfield negative    Pain with ROM negative    ROM:    Flexion   120  Extension   30  Abduction   45  Adduction   20  External Rotation 45  Internal Rotation 35    Flexion contracture negative    FADIR negative  FADER negative    Tenderness to palpation over lateral and posterolateral greater tochanter positive    X-ray images were examined and personally interpreted by me.  Three views of the left hip dated 04/23/2024 show the femoral head well reduced in the acetabulum with no advanced arthritic changes and no acute fractures.    Dx:  Greater trochanteric bursitis left hip    Plan:  We discussed treatment options.  The patient refused injection because she states that she received a steroid injection in the past that resulted in RSD symptoms.  She has taken oral steroids without any issue.  I gave her a pulse dose of steroids to take by mouth combined with 5 days of Toradol and the to the should cool offer acute inflammation.  We are also going to have her hold other NSAIDs while she takes a Toradol.

## 2024-05-15 DIAGNOSIS — E11.9 TYPE 2 DIABETES MELLITUS WITHOUT COMPLICATION: ICD-10-CM

## 2024-07-08 DIAGNOSIS — K21.9 GASTROESOPHAGEAL REFLUX DISEASE WITHOUT ESOPHAGITIS: ICD-10-CM

## 2024-07-08 RX ORDER — OMEPRAZOLE 20 MG/1
CAPSULE, DELAYED RELEASE ORAL
Qty: 180 CAPSULE | Refills: 3 | Status: SHIPPED | OUTPATIENT
Start: 2024-07-08

## 2024-07-08 NOTE — TELEPHONE ENCOUNTER
Care Due:                  Date            Visit Type   Department     Provider  --------------------------------------------------------------------------------                                EP -                              PRIMARY      [facility] FAMILY  Last Visit: 11-      CARE ([facility])   MEDICINE       Ervin Garnica  Next Visit: None Scheduled  None         None Found                                                            Last  Test          Frequency    Reason                     Performed    Due Date  --------------------------------------------------------------------------------    HBA1C.......  6 months...  metFORMIN................  11- 04-    [facility] Embedded Care Due Messages. Reference number: [id number].   7/08/2024 8:27:42 AM CDT

## 2024-08-27 ENCOUNTER — PATIENT MESSAGE (OUTPATIENT)
Dept: ADMINISTRATIVE | Facility: HOSPITAL | Age: 64
End: 2024-08-27
Payer: COMMERCIAL

## 2024-08-28 ENCOUNTER — HOSPITAL ENCOUNTER (OUTPATIENT)
Dept: RADIOLOGY | Facility: HOSPITAL | Age: 64
Discharge: HOME | End: 2024-08-28
Attending: FAMILY MEDICINE
Payer: COMMERCIAL

## 2024-08-28 ENCOUNTER — TELEPHONE (OUTPATIENT)
Dept: FAMILY MEDICINE | Facility: CLINIC | Age: 64
End: 2024-08-28
Payer: COMMERCIAL

## 2024-08-28 ENCOUNTER — LAB VISIT (OUTPATIENT)
Dept: LAB | Facility: HOSPITAL | Age: 64
End: 2024-08-28
Attending: FAMILY MEDICINE
Payer: COMMERCIAL

## 2024-08-28 ENCOUNTER — PATIENT OUTREACH (OUTPATIENT)
Dept: ADMINISTRATIVE | Facility: HOSPITAL | Age: 64
End: 2024-08-28
Payer: COMMERCIAL

## 2024-08-28 DIAGNOSIS — Z12.31 OTHER SCREENING MAMMOGRAM: ICD-10-CM

## 2024-08-28 DIAGNOSIS — E11.9 TYPE 2 DIABETES MELLITUS WITHOUT COMPLICATION: ICD-10-CM

## 2024-08-28 LAB
ESTIMATED AVG GLUCOSE: 131 MG/DL (ref 68–131)
HBA1C MFR BLD: 6.2 % (ref 4.5–6.2)

## 2024-08-28 PROCEDURE — 77063 BREAST TOMOSYNTHESIS BI: CPT | Mod: 26,,, | Performed by: RADIOLOGY

## 2024-08-28 PROCEDURE — 36415 COLL VENOUS BLD VENIPUNCTURE: CPT | Performed by: FAMILY MEDICINE

## 2024-08-28 PROCEDURE — 83036 HEMOGLOBIN GLYCOSYLATED A1C: CPT | Performed by: FAMILY MEDICINE

## 2024-08-28 PROCEDURE — 77067 SCR MAMMO BI INCL CAD: CPT | Mod: 26,,, | Performed by: RADIOLOGY

## 2024-08-28 PROCEDURE — 77063 BREAST TOMOSYNTHESIS BI: CPT | Mod: TC

## 2024-08-28 PROCEDURE — 77067 SCR MAMMO BI INCL CAD: CPT | Mod: TC

## 2024-08-28 NOTE — TELEPHONE ENCOUNTER
Informed pt., per Dr. Garnica, that her A1c results were fine and no changes to tx needed.  Pt. Ancelmo.

## 2024-08-28 NOTE — TELEPHONE ENCOUNTER
----- Message from Kiarra Mo sent at 8/28/2024 11:45 AM CDT -----  Regarding: return call  Contact: patient  Type:  Patient Returning Call    Who Called:  patient  Who Left Message for Patient:  unknown  Does the patient know what this is regarding?:    Best Call Back Number:  [phone] (home)     Additional Information:  please call patient to advise.  Thanks!

## 2024-08-28 NOTE — PROGRESS NOTES
Population Health Chart Review & Patient Outreach Details      Additional [facility] Health Notes:               Updates Requested / Reviewed:      Updated Care Coordination Note         Health Maintenance Topics Overdue:      VB Score: 2     Urine Screening  Eye Exam                       Health Maintenance Topic(s) Outreach Outcomes & Actions Taken:    Eye Exam - Outreach Outcomes & Actions Taken  : left message for patient to return call to schedule eye exam    Lab(s) - Outreach Outcomes & Actions Taken  : left message for patient to return call to schedule

## 2024-09-28 DIAGNOSIS — E11.9 TYPE 2 DIABETES MELLITUS WITHOUT COMPLICATION, WITHOUT LONG-TERM CURRENT USE OF INSULIN: ICD-10-CM

## 2024-09-28 RX ORDER — METFORMIN HYDROCHLORIDE 500 MG/1
500 TABLET ORAL 2 TIMES DAILY
Qty: 180 TABLET | Refills: 0 | Status: SHIPPED | OUTPATIENT
Start: 2024-09-28

## 2024-09-28 NOTE — TELEPHONE ENCOUNTER
Provider Staff:  Action required for this patient    Requires labs      Please see care gap opportunities below in Care Due Message.    Thanks!  [facility]     Appointments      Date Provider   Last Visit   11/2/2023 Ervin Garnica MD   Next Visit   10/4/2024 Ervin Garnica MD     Refill Decision Note   Daina Britt  is requesting a refill authorization.  Brief Assessment and Rationale for Refill:  Approve     Medication Therapy Plan:         Extended chart review required: Yes   Comments:     Note composed:5:43 PM 09/28/2024

## 2024-09-28 NOTE — TELEPHONE ENCOUNTER
Care Due:                  Date            Visit Type   Department     Provider  --------------------------------------------------------------------------------                                EP -                              [facility]  Last Visit: 11-      CARE ([facility])   ROCIO Garnica                              EP -                              PRIMARY      [facility]  Next Visit: 10-      CARE ([facility])   MEDICINE       Ervin Garnica                                                            Last  Test          Frequency    Reason                     Performed    Due Date  --------------------------------------------------------------------------------    CMP.........  12 months..  losartan, metFORMIN,       11-   10-                             rosuvastatin.............    Lipid Panel.  12 months..  rosuvastatin.............  11-   10-    [facility] Embedded Care Due Messages. Reference number: [id number].   9/28/2024 6:14:42 AM CDT

## 2024-10-04 ENCOUNTER — OFFICE VISIT (OUTPATIENT)
Dept: FAMILY MEDICINE | Facility: CLINIC | Age: 64
End: 2024-10-04
Payer: COMMERCIAL

## 2024-10-04 ENCOUNTER — LAB VISIT (OUTPATIENT)
Dept: LAB | Facility: HOSPITAL | Age: 64
End: 2024-10-04
Attending: FAMILY MEDICINE
Payer: COMMERCIAL

## 2024-10-04 VITALS
OXYGEN SATURATION: 96 % | WEIGHT: 183.19 LBS | BODY MASS INDEX: 30.52 KG/M2 | SYSTOLIC BLOOD PRESSURE: 154 MMHG | HEART RATE: 55 BPM | DIASTOLIC BLOOD PRESSURE: 72 MMHG | HEIGHT: 65 IN

## 2024-10-04 DIAGNOSIS — I10 ESSENTIAL HYPERTENSION: ICD-10-CM

## 2024-10-04 DIAGNOSIS — E11.9 TYPE 2 DIABETES MELLITUS WITHOUT COMPLICATION, WITHOUT LONG-TERM CURRENT USE OF INSULIN: Primary | ICD-10-CM

## 2024-10-04 DIAGNOSIS — K21.9 GASTROESOPHAGEAL REFLUX DISEASE WITHOUT ESOPHAGITIS: ICD-10-CM

## 2024-10-04 DIAGNOSIS — E78.2 MIXED HYPERLIPIDEMIA: ICD-10-CM

## 2024-10-04 LAB
CHOLEST SERPL-MCNC: 117 MG/DL (ref 120–199)
CHOLEST/HDLC SERPL: 2.1 {RATIO} (ref 2–5)
HDLC SERPL-MCNC: 57 MG/DL (ref 40–75)
HDLC SERPL: 48.7 % (ref 20–50)
LDLC SERPL CALC-MCNC: 31.6 MG/DL (ref 63–159)
NONHDLC SERPL-MCNC: 60 MG/DL
TRIGL SERPL-MCNC: 142 MG/DL (ref 30–150)

## 2024-10-04 PROCEDURE — 99999 PR PBB SHADOW E&M-EST. PATIENT-LVL IV: CPT | Mod: PBBFAC,,, | Performed by: FAMILY MEDICINE

## 2024-10-04 PROCEDURE — 80061 LIPID PANEL: CPT | Performed by: FAMILY MEDICINE

## 2024-10-04 PROCEDURE — 36415 COLL VENOUS BLD VENIPUNCTURE: CPT | Mod: PO | Performed by: FAMILY MEDICINE

## 2024-10-04 NOTE — PROGRESS NOTES
Patient ID: Daina Britt is a 64 y.o. female.    Chief Complaint: Diabetes    History of Present Illness    CHIEF COMPLAINT:  Daina presents today for follow up.    DIABETES MANAGEMENT:  She reports type 2 diabetes is controlled with diet. She denies using insulin or experiencing any long-term sequelae. She is aware of a recent A1c result that was slightly elevated at 6.2. She reports keeping up with urine protein loss screens as recommended.    CARDIOVASCULAR HEALTH:  She has essential hypertension and mixed hyperlipidemia. She denies chest pain or shortness of breath suggestive of coronary artery disease or congestive heart failure. She is currently being treated with rosuvastatin for mixed hyperlipidemia, with previous lipid panels showing acceptable results.    GASTROESOPHAGEAL REFLUX DISEASE (GERD):  She reports GERD is well-controlled on omeprazole. She denies solid food dysphagia or any rapid weight changes.    PREVENTIVE CARE:  She reports having seen an optometrist in January of this year for an eye exam.    PLASMA DONATION:  She has been approved to donate plasma and has completed the necessary paperwork.      ROS:  General: -fever, -chills, -fatigue, -weight gain, -weight loss  Eyes: -vision changes, -redness, -discharge  ENT: -ear pain, -nasal congestion, -sore throat, -difficulty swallowing  Cardiovascular: -chest pain, -palpitations, -lower extremity edema  Respiratory: -cough, -shortness of breath  Gastrointestinal: -abdominal pain, -nausea, -vomiting, -diarrhea, -constipation, -blood in stool  Genitourinary: -dysuria, -hematuria, -frequency  Musculoskeletal: -joint pain, -muscle pain  Skin: -rash, -lesion  Neurological: -headache, -dizziness, -numbness, -tingling  Psychiatric: -anxiety, -depression, -sleep difficulty         Physical Exam    Vitals: Blood pressure: 152/unknown.  General: No acute distress. Well-developed. Well-nourished.  Eyes: EOMI. Sclerae anicteric.  HENT: Normocephalic.  Atraumatic. Nares patent. Moist oral mucosa.  Ears: Bilateral TMs clear. Bilateral EACs clear.  Cardiovascular: Regular rate. Regular rhythm. No murmurs. No rubs. No gallops. Normal S1, S2.  Respiratory: Normal respiratory effort. Clear to auscultation bilaterally. No rales. No rhonchi. No wheezing.  Abdomen: Soft. Non-tender. Non-distended. Normoactive bowel sounds.  Musculoskeletal: No  obvious deformity.  Extremities: No lower extremity edema.  Neurological: Alert & oriented x3. No slurred speech. Normal gait.  Psychiatric: Normal mood. Normal affect. Good insight. Good judgment.  Skin: Warm. Dry. No rash.         Assessment & Plan    Approved patient for plasma donation after reviewing paperwork  Assessed type 2 diabetes as well-controlled with diet alone, no insulin required  Noted recent A1c of 6.2, slightly elevated but near normal range  Evaluated essential hypertension; no signs of CAD or CHF  Observed slightly elevated systolic BP of 152; will reassess before considering treatment changes  Reviewed mixed hyperlipidemia treatment with rosuvastatin; labs historically acceptable  Confirmed GERD control on current PPI therapy; no dysphagia or weight changes reported    MIXED HYPERLIPIDEMIA:  - Continue rosuvastatin for mixed hyperlipidemia.  - Order lipid panel.  - Previous lipid labs have been acceptable.  - The patient is due for a lipid panel.    TYPE 2 DIABETES:  - Refer the patient to Podiatry for a formal foot exam.  - Note that the patient's recent A1c is 6.2, which is slightly elevated but near normal.  - Confirm that the patient is keeping up with urine protein loss screens.  - Observe that Type 2 diabetes is completely controlled with diet.  - Note that the patient has no long-term sequelae associated with diabetes and does not use insulin.  - Confirm that the patient has had an eye exam in January of this year.    GERD:  - Continue omeprazole for GERD.  - Note that the patient has no solid food  dysphagia or rapid weight changes recently.  - Observe that gastroesophageal reflux is controlled on current treatment.    HYPERTENSION:  - Instruct the patient to check blood pressure at home.  - Note that the patient is not experiencing chest pain or dyspnea suggestive of CAD or CHF.  - Observe that current systolic blood pressure is slightly elevated at 152.  - Recheck blood pressure during next visit.  - Advise that if systolic pressure is consistently higher than 140, a change in treatment plan will be recommended.    FOLLOW UP:  - None Follow up in 6 months.  - Follow up for further health maintenance discussion at next appointment.              No follow-ups on file.    This note was generated with the assistance of ambient listening technology. Verbal consent was obtained by the patient and accompanying visitor(s) for the recording of patient appointment to facilitate this note. I attest to having reviewed and edited the generated note for accuracy, though some syntax or spelling errors may persist. Please contact the author of this note for any clarification.

## 2024-10-16 ENCOUNTER — CLINICAL SUPPORT (OUTPATIENT)
Dept: FAMILY MEDICINE | Facility: CLINIC | Age: 64
End: 2024-10-16
Payer: COMMERCIAL

## 2024-10-16 VITALS
SYSTOLIC BLOOD PRESSURE: 124 MMHG | RESPIRATION RATE: 18 BRPM | OXYGEN SATURATION: 97 % | DIASTOLIC BLOOD PRESSURE: 76 MMHG | HEART RATE: 72 BPM

## 2024-10-16 DIAGNOSIS — Z01.30 BLOOD PRESSURE CHECK: Primary | ICD-10-CM

## 2024-10-16 PROCEDURE — 99999 PR PBB SHADOW E&M-EST. PATIENT-LVL III: CPT | Mod: PBBFAC,,,

## 2024-10-16 PROCEDURE — 99499 UNLISTED E&M SERVICE: CPT | Mod: S$GLB,,, | Performed by: FAMILY MEDICINE

## 2024-10-16 NOTE — PROGRESS NOTES
Daina Britt 64 y.o. female is here today for Blood Pressure check.   History of HTN yes.    Review of patient's allergies indicates:   Allergen Reactions    Iodinated contrast media Other (See Comments)     Myelogram dye-causes seizures    Titanium analogues Swelling     Titanium screws     Cortisone Other (See Comments)     Cortisone injection  (RSDS)(CRPS)    Influenza virus vaccines      Guillian barre    Tetanus vaccines and toxoid      Guillian barre syndrome      Codeine Nausea And Vomiting and Rash    Darvon [propoxyphene] Nausea And Vomiting and Rash    Demerol [meperidine] Nausea And Vomiting and Rash    Opioids - morphine analogues Nausea And Vomiting and Rash    Paragoric Nausea And Vomiting and Rash    Wygesic Nausea And Vomiting and Rash     Creatinine   Date Value Ref Range Status   11/02/2023 0.7 0.5 - 1.4 mg/dL Final     Sodium   Date Value Ref Range Status   11/02/2023 143 136 - 145 mmol/L Final     Potassium   Date Value Ref Range Status   11/02/2023 4.5 3.5 - 5.1 mmol/L Final   ]  Patient verifies taking blood pressure medications on a regular basis at the same time of the day.     Current Outpatient Medications:     amLODIPine (NORVASC) 5 MG tablet, Take 1 tablet by mouth once daily, Disp: 90 tablet, Rfl: 3    aspirin (ECOTRIN) 81 MG EC tablet, Take 81 mg by mouth once daily. Take one daily, Disp: , Rfl:     carboxymethylcellulose (REFRESH PLUS) 0.5 % Dpet, 1 drop 3 (three) times daily as needed., Disp: , Rfl:     diclofenac (VOLTAREN) 50 MG EC tablet, Take 1 tablet (50 mg total) by mouth 3 (three) times daily as needed. (Patient not taking: Reported on 10/4/2024), Disp: 20 tablet, Rfl: 2    fluticasone propionate (FLONASE) 50 mcg/actuation nasal spray, 1 spray by Each Nostril route once daily., Disp: , Rfl:     ibuprofen-acetaminophen (ADVIL DUAL ACTION) 125-250 mg Tab, Take by mouth every 8 (eight) hours. Take 2 twice a day, Disp: , Rfl:     loratadine (CLARITIN) 10 mg tablet, Take 10 mg  by mouth once daily., Disp: , Rfl:     losartan (COZAAR) 50 MG tablet, Take 1 tablet by mouth once daily, Disp: 90 tablet, Rfl: 2    mag/aluminum/sod bicarb/alginc (GAVISCON ORAL), Take 0.5 fluid ounces by mouth once daily., Disp: , Rfl:     metFORMIN (GLUCOPHAGE) 500 MG tablet, Take 1 tablet (500 mg total) by mouth 2 (two) times daily., Disp: 180 tablet, Rfl: 0    omeprazole (PRILOSEC) 20 MG capsule, Take 1 capsule by mouth twice daily, Disp: 180 capsule, Rfl: 3    rosuvastatin (CRESTOR) 20 MG tablet, Take 1 tablet (20 mg total) by mouth once daily., Disp: 90 tablet, Rfl: 3    zinc gluconate 50 mg tablet, Take 50 mg by mouth once daily. (Patient not taking: Reported on 10/4/2024), Disp: , Rfl:   Does patient have record of home blood pressure readings no. Readings have been averaging n/a.  Pt. Denies taking blood pressure readings regularly.  Home blood pressure digital readin/71    Last dose of blood pressure medication was taken at 8:00 AM today.  Patient is asymptomatic.   Complains of n/a.    Manual BP: 134/80 , Pulse: 60 .    Blood pressure reading after 15 minutes was 124/76, Pulse 72  Dr. Garnica notified.

## 2024-10-29 DIAGNOSIS — I10 ESSENTIAL HYPERTENSION: ICD-10-CM

## 2024-10-29 RX ORDER — LOSARTAN POTASSIUM 50 MG/1
TABLET ORAL
Qty: 90 TABLET | Refills: 3 | Status: SHIPPED | OUTPATIENT
Start: 2024-10-29

## 2024-11-14 DIAGNOSIS — I10 ESSENTIAL HYPERTENSION: ICD-10-CM

## 2024-12-21 DIAGNOSIS — I10 ESSENTIAL HYPERTENSION: ICD-10-CM

## 2024-12-21 DIAGNOSIS — E78.2 MIXED HYPERLIPIDEMIA: ICD-10-CM

## 2024-12-21 DIAGNOSIS — I73.00 RAYNAUD'S DISEASE WITHOUT GANGRENE: ICD-10-CM

## 2024-12-21 DIAGNOSIS — E11.9 TYPE 2 DIABETES MELLITUS WITHOUT COMPLICATION, WITHOUT LONG-TERM CURRENT USE OF INSULIN: ICD-10-CM

## 2024-12-22 RX ORDER — METFORMIN HYDROCHLORIDE 500 MG/1
500 TABLET ORAL 2 TIMES DAILY
Qty: 180 TABLET | Refills: 3 | Status: SHIPPED | OUTPATIENT
Start: 2024-12-22

## 2024-12-22 RX ORDER — AMLODIPINE BESYLATE 5 MG/1
5 TABLET ORAL DAILY
Qty: 90 TABLET | Refills: 3 | Status: SHIPPED | OUTPATIENT
Start: 2024-12-22

## 2024-12-22 RX ORDER — ROSUVASTATIN CALCIUM 20 MG/1
20 TABLET, COATED ORAL DAILY
Qty: 90 TABLET | Refills: 0 | Status: SHIPPED | OUTPATIENT
Start: 2024-12-22

## 2024-12-22 NOTE — TELEPHONE ENCOUNTER
Refill Routing Note   Medication(s) are not appropriate for processing by [facility] for the following reason(s):        Required labs outdated    [facility] action(s):  Defer  Approve     Requires labs : Yes           Appointments  past 12m or future 3m with PCP    Date Provider   Last Visit   10/4/2024 Ervin Garnica MD   Next Visit   4/4/2025 Ervin Garnica MD   ED visits in past 90 days: 0        Note composed:4:11 PM 12/22/2024

## 2024-12-22 NOTE — TELEPHONE ENCOUNTER
Care Due:                  Date            Visit Type   Department     Provider  --------------------------------------------------------------------------------                                EP -                              [facility]  Last Visit: 10-      CARE ([facility])   ROCIO Garnica                              EP -                              PRIMARY      [facility]  Next Visit: 04-      CARE ([facility])   ROCIO Garnica                                                            Last  Test          Frequency    Reason                     Performed    Due Date  --------------------------------------------------------------------------------    CMP.........  12 months..  losartan, metFORMIN,       11-   10-                             rosuvastatin.............    HBA1C.......  6 months...  metFORMIN................  08- 02-    [facility] Embedded Care Due Messages. Reference number: [id number].   12/21/2024 10:37:39 PM CST

## 2025-03-05 DIAGNOSIS — E11.9 TYPE 2 DIABETES MELLITUS WITHOUT COMPLICATION: ICD-10-CM

## 2025-03-22 DIAGNOSIS — E78.2 MIXED HYPERLIPIDEMIA: ICD-10-CM

## 2025-03-22 NOTE — TELEPHONE ENCOUNTER
Care Due:                  Date            Visit Type   Department     Provider  --------------------------------------------------------------------------------                                EP -                              [facility]  Last Visit: 10-      CARE ([facility])   ROCIO Garnica                              EP -                              PRIMARY      [facility]  Next Visit: 04-      CARE ([facility])   ROCIO Garnica                                                            Last  Test          Frequency    Reason                     Performed    Due Date  --------------------------------------------------------------------------------    CMP.........  12 months..  losartan, metFORMIN,       11-   10-                             rosuvastatin.............    HBA1C.......  6 months...  metFORMIN................  08- 02-    [facility] Embedded Care Due Messages. Reference number: [id number].   3/22/2025 9:31:29 AM CDT

## 2025-03-24 RX ORDER — ROSUVASTATIN CALCIUM 20 MG/1
20 TABLET, COATED ORAL
Qty: 90 TABLET | Refills: 3 | Status: SHIPPED | OUTPATIENT
Start: 2025-03-24

## 2025-03-24 NOTE — TELEPHONE ENCOUNTER
Refill Routing Note   Medication(s) are not appropriate for processing by [facility] for the following reason(s):        Required labs outdated    [facility] action(s):  Defer   Requires labs : Yes             Appointments  past 12m or future 3m with PCP    Date Provider   Last Visit   10/4/2024 Ervin Garnica MD   Next Visit   4/4/2025 Ervin Garnica MD   ED visits in past 90 days: 0        Note composed:11:43 PM 03/23/2025

## 2025-04-01 ENCOUNTER — LAB VISIT (OUTPATIENT)
Dept: LAB | Facility: HOSPITAL | Age: 65
End: 2025-04-01
Attending: FAMILY MEDICINE
Payer: COMMERCIAL

## 2025-04-01 DIAGNOSIS — E11.9 TYPE 2 DIABETES MELLITUS WITHOUT COMPLICATION: ICD-10-CM

## 2025-04-01 DIAGNOSIS — I10 ESSENTIAL HYPERTENSION: ICD-10-CM

## 2025-04-01 PROCEDURE — 83036 HEMOGLOBIN GLYCOSYLATED A1C: CPT

## 2025-04-01 PROCEDURE — 36415 COLL VENOUS BLD VENIPUNCTURE: CPT | Mod: PO

## 2025-04-01 PROCEDURE — 82043 UR ALBUMIN QUANTITATIVE: CPT

## 2025-04-01 PROCEDURE — 82040 ASSAY OF SERUM ALBUMIN: CPT

## 2025-04-02 ENCOUNTER — RESULTS FOLLOW-UP (OUTPATIENT)
Dept: FAMILY MEDICINE | Facility: CLINIC | Age: 65
End: 2025-04-02

## 2025-04-02 LAB
ALBUMIN SERPL BCP-MCNC: 3.8 G/DL (ref 3.5–5.2)
ALBUMIN/CREAT UR: 7.7 UG/MG
ALP SERPL-CCNC: 93 UNIT/L (ref 40–150)
ALT SERPL W/O P-5'-P-CCNC: 21 UNIT/L (ref 10–44)
ANION GAP (OHS): 14 MMOL/L (ref 8–16)
AST SERPL-CCNC: 18 UNIT/L (ref 11–45)
BILIRUB SERPL-MCNC: 0.5 MG/DL (ref 0.1–1)
BUN SERPL-MCNC: 13 MG/DL (ref 8–23)
CALCIUM SERPL-MCNC: 9.4 MG/DL (ref 8.7–10.5)
CHLORIDE SERPL-SCNC: 105 MMOL/L (ref 95–110)
CO2 SERPL-SCNC: 25 MMOL/L (ref 23–29)
CREAT SERPL-MCNC: 0.7 MG/DL (ref 0.5–1.4)
CREAT UR-MCNC: 142 MG/DL (ref 15–325)
EAG (OHS): 140 MG/DL (ref 68–131)
GFR SERPLBLD CREATININE-BSD FMLA CKD-EPI: >60 ML/MIN/1.73/M2
GLUCOSE SERPL-MCNC: 116 MG/DL (ref 70–110)
HBA1C MFR BLD: 6.5 % (ref 4–5.6)
MICROALBUMIN UR-MCNC: 11 UG/ML (ref ?–5000)
POTASSIUM SERPL-SCNC: 4.4 MMOL/L (ref 3.5–5.1)
PROT SERPL-MCNC: 6.7 GM/DL (ref 6–8.4)
SODIUM SERPL-SCNC: 144 MMOL/L (ref 136–145)

## 2025-04-04 ENCOUNTER — OFFICE VISIT (OUTPATIENT)
Dept: FAMILY MEDICINE | Facility: CLINIC | Age: 65
End: 2025-04-04
Payer: COMMERCIAL

## 2025-04-04 VITALS
WEIGHT: 187.63 LBS | BODY MASS INDEX: 31.26 KG/M2 | HEIGHT: 65 IN | HEART RATE: 63 BPM | SYSTOLIC BLOOD PRESSURE: 118 MMHG | OXYGEN SATURATION: 97 % | DIASTOLIC BLOOD PRESSURE: 74 MMHG

## 2025-04-04 DIAGNOSIS — G61.0 GUILLAIN BARRÉ SYNDROME: ICD-10-CM

## 2025-04-04 DIAGNOSIS — E78.2 MIXED HYPERLIPIDEMIA: ICD-10-CM

## 2025-04-04 DIAGNOSIS — E11.9 TYPE 2 DIABETES MELLITUS WITHOUT COMPLICATION, WITHOUT LONG-TERM CURRENT USE OF INSULIN: Primary | ICD-10-CM

## 2025-04-04 DIAGNOSIS — I73.00 RAYNAUD'S DISEASE WITHOUT GANGRENE: ICD-10-CM

## 2025-04-04 DIAGNOSIS — E66.811 OBESITY (BMI 30.0-34.9): ICD-10-CM

## 2025-04-04 DIAGNOSIS — G90.522 COMPLEX REGIONAL PAIN SYNDROME TYPE 1 OF LEFT LOWER EXTREMITY: ICD-10-CM

## 2025-04-04 PROCEDURE — 99999 PR PBB SHADOW E&M-EST. PATIENT-LVL IV: CPT | Mod: PBBFAC,,, | Performed by: FAMILY MEDICINE

## 2025-04-04 RX ORDER — TIRZEPATIDE 2.5 MG/.5ML
2.5 INJECTION, SOLUTION SUBCUTANEOUS
Qty: 4 PEN | Refills: 2 | Status: SHIPPED | OUTPATIENT
Start: 2025-04-04

## 2025-04-04 NOTE — PROGRESS NOTES
Patient ID: Daina Britt is a 64 y.o. female.    Chief Complaint: Follow-up (6 month follow up)    History of Present Illness    CHIEF COMPLAINT:  64-year-old female presents today for follow up    DIABETES:  She has Type 2 Diabetes. Hemoglobin A1c was slightly elevated but still within normal range for controlled diabetes. She currently takes metformin, which is planned to be discontinued.    CARDIOVASCULAR AND VASCULAR CONDITIONS:  She has Raynaud's disease, well-controlled with amlodipine. She has mixed hyperlipidemia, managed with rosuvastatin. She denies pain, shortness of breath, or symptoms of coronary artery disease or congestive heart failure.    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    OTHER MEDICAL CONDITIONS:  She has degenerative joint disease and reflux symptoms. Current BMI is 31.    IMMUNIZATIONS:  She declines all immunizations due to history of Guillain-Barré syndrome.    SOCIAL HISTORY:  She is currently employed at [employer].      ROS:  General: -fever, -chills, -fatigue, -weight gain, -weight loss  Eyes: -vision changes, -redness, -discharge  ENT: -ear pain, -nasal congestion, -sore throat  Cardiovascular: -chest pain, -palpitations, -lower extremity edema  Respiratory: -cough, -shortness of breath  Gastrointestinal: -abdominal pain, -nausea, -vomiting, -diarrhea, -constipation, -blood in stool, +indigestion  Genitourinary: -dysuria, -hematuria, -frequency  Musculoskeletal: +joint pain, -muscle pain  Skin: -rash, -lesion  Neurological: -headache, -dizziness, -numbness, -tingling  Psychiatric: -anxiety, -depression, -sleep difficulty         Physical Exam    General: No acute distress. Well-developed. Well-nourished.  Eyes: EOMI. Sclerae anicteric.  HENT: Normocephalic. Atraumatic. Nares patent. Moist oral mucosa.  Ears: Bilateral TMs clear. Bilateral EACs  clear.  Cardiovascular: Regular rate. Regular rhythm. No murmurs. No rubs. No gallops. Normal S1, S2.  Respiratory: Normal respiratory effort. Clear to auscultation bilaterally. No rales. No rhonchi. No wheezing.  Abdomen: Soft. Non-tender. Non-distended. Normoactive bowel sounds.  Musculoskeletal: No  obvious deformity.  Extremities: No lower extremity edema.  Neurological: Alert & oriented x3. No slurred speech. Normal gait.  Psychiatric: Normal mood. Normal affect. Good insight. Good judgment.  Skin: Warm. Dry. No rash.         Assessment & Plan    E11.9 Type 2 diabetes mellitus without complications  I73.00 Raynaud's syndrome without gangrene  E78.2 Mixed hyperlipidemia  G90.522 Complex regional pain syndrome I of left lower limb  M19.90 Unspecified osteoarthritis, unspecified site  K21.9 Gastro-esophageal reflux disease without esophagitis  Z68.31 Body mass index [BMI] 31.0-31.9, adult  Z86.61 Personal history of infections of the central nervous system  Z28.89 Immunization not carried out for other reason    IMPRESSION:  - Hemoglobin A1c slightly elevated but WNL for controlled type 2 diabetes.  - Started Mounjaro 2.5 mg weekly for weight loss and improved glycemic control, with the goal of discontinuing metformin. Current BMI 31; potential benefits for degenerative joint disease and reflux symptoms.  - Raynaud's disease well-controlled on amlodipine.  - Mixed HLD managed with rosuvastatin; tolerating well without symptoms of CAD or CHF.  - History of Guillain-Barré syndrome and complex regional pain syndrome of left lower extremity, impacting treatment decisions.    TYPE 2 DIABETES MELLITUS:  - Evaluated the patient's Hemoglobin A1c, which was slightly elevated but still within normal range for controlled type 2 diabetes.  - Assessed that the patient's type 2 diabetes is currently controlled, as evidenced by the Hemoglobin A1c results.  - Discussed Mounjaro as a potential treatment option with the patient.  -  Prescribed Mounjaro 2.5 mg weekly for improved glycemic control, with the goal of discontinuing metformin.  - Instructed the patient to follow up every few months via portal.    RAYNAUD'S SYNDROME:  - Monitored the patient's Raynaud's disease, which has been controlled since switching to amlodipine.  - Evaluated that the patient seems to be doing well on amlodipine for Raynaud's disease.  - Continued amlodipine for Raynaud's disease management.    MIXED HYPERLIPIDEMIA:  - Monitored the patient's mixed hyperlipidemia, noting no overt symptoms of pain, shortness of breath, CAD or CHF.  - Evaluated that the patient tolerates rosuvastatin well with no adverse effects.  - Continued rosuvastatin for mixed hyperlipidemia management.    COMPLEX REGIONAL PAIN SYNDROME:  - Noted that the patient developed complex regional pain syndrome of the left lower extremity after an injection mishap.  - Referred the patient to podiatry for further evaluation and management.    OSTEOARTHRITIS:  - Assessed that Mounjaro may help with degenerative joint disease.  - Referred the patient to podiatry for follow-up on degenerative joint disease.    GASTROESOPHAGEAL REFLUX DISEASE:  - Assessed that Mounjaro may help with reflux symptoms.    OBESITY MANAGEMENT:  - Monitored the patient's current BMI, which is 31.  - Assessed that Mounjaro is recommended for better control of weight.  - Prescribed Mounjaro 2.5 mg weekly for weight loss.    HISTORY OF GUILLAIN-BARRÉ SYNDROME:  - Noted the patient's history of Guillain-Barré syndrome.  - Advised against administering any immunizations due to the patient's history of Guillain-Barré syndrome.            Follow up in about 6 months (around 10/4/2025).    This note was generated with the assistance of ambient listening technology. Verbal consent was obtained by the patient and accompanying visitor(s) for the recording of patient appointment to facilitate this note. I attest to having reviewed and  edited the generated note for accuracy, though some syntax or spelling errors may persist. Please contact the author of this note for any clarification.

## 2025-06-23 DIAGNOSIS — E11.9 TYPE 2 DIABETES MELLITUS WITHOUT COMPLICATION, WITHOUT LONG-TERM CURRENT USE OF INSULIN: ICD-10-CM

## 2025-06-23 RX ORDER — TIRZEPATIDE 2.5 MG/.5ML
INJECTION, SOLUTION SUBCUTANEOUS
Qty: 4 ML | Refills: 0 | Status: SHIPPED | OUTPATIENT
Start: 2025-06-23

## 2025-06-23 NOTE — TELEPHONE ENCOUNTER
No care due was identified.  [facility] Embedded Care Due Messages. Reference number: [id number].   6/23/2025 6:13:32 AM CDT

## 2025-06-23 NOTE — TELEPHONE ENCOUNTER
Refill Routing Note   Medication(s) are not appropriate for processing by [facility] for the following reason(s):        New or recently adjusted medication    [facility] action(s):  Defer               Appointments  past 12m or future 3m with PCP    Date Provider   Last Visit   4/4/2025 Ervin Garnica MD   Next Visit   10/7/2025 Ervin Garnica MD   ED visits in past 90 days: 0        Note composed:11:06 AM 06/23/2025

## 2025-06-29 DIAGNOSIS — K21.9 GASTROESOPHAGEAL REFLUX DISEASE WITHOUT ESOPHAGITIS: ICD-10-CM

## 2025-06-30 RX ORDER — OMEPRAZOLE 20 MG/1
20 CAPSULE, DELAYED RELEASE ORAL 2 TIMES DAILY
Qty: 180 CAPSULE | Refills: 3 | Status: SHIPPED | OUTPATIENT
Start: 2025-06-30

## 2025-06-30 NOTE — TELEPHONE ENCOUNTER
No care due was identified.  [facility] Embedded Care Due Messages. Reference number: [id number].   6/29/2025 9:17:25 PM CDT

## 2025-06-30 NOTE — TELEPHONE ENCOUNTER
Refill Decision Note   Daina Britt  is requesting a refill authorization.  Brief Assessment and Rationale for Refill:  Approve     Medication Therapy Plan:         Comments:     Note composed:2:46 PM 06/30/2025

## 2025-07-02 ENCOUNTER — TELEPHONE (OUTPATIENT)
Dept: FAMILY MEDICINE | Facility: CLINIC | Age: 65
End: 2025-07-02
Payer: COMMERCIAL

## 2025-07-02 NOTE — TELEPHONE ENCOUNTER
Copied from CRM #[id number]. Topic: General Inquiry - Patient Advice  >> Jul 2, 2025 12:21 PM Nydia wrote:  Type:  Pharmacy Calling to Clarify an RX    Name of Caller: noemíum     Prescription Name: omeprazole (PRILOSEC) 20 MG capsule      What do they need to clarify?: pa    Best Call Back Number:     Additional Information: ref# [id number]    Fax     Needing additional clinical notes      Please advise    Thanks

## 2025-07-18 DIAGNOSIS — E11.9 TYPE 2 DIABETES MELLITUS WITHOUT COMPLICATION, WITHOUT LONG-TERM CURRENT USE OF INSULIN: ICD-10-CM

## 2025-07-19 RX ORDER — TIRZEPATIDE 2.5 MG/.5ML
INJECTION, SOLUTION SUBCUTANEOUS
Qty: 12 PEN | Refills: 0 | Status: SHIPPED | OUTPATIENT
Start: 2025-07-19

## 2025-07-19 NOTE — TELEPHONE ENCOUNTER
Refill Routing Note   Medication(s) are not appropriate for processing by [facility] for the following reason(s):        New or recently adjusted medication    [facility] action(s):  Defer     Requires labs : Yes             Appointments  past 12m or future 3m with PCP    Date Provider   Last Visit   4/4/2025 Ervin Garnica MD   Next Visit   10/7/2025 Ervin Garnica MD   ED visits in past 90 days: 0        Note composed:5:15 AM 07/19/2025

## 2025-07-19 NOTE — TELEPHONE ENCOUNTER
Care Due:                  Date            Visit Type   Department     Provider  --------------------------------------------------------------------------------                                EP -                              [facility]  Last Visit: 04-      CARE ([facility])   ROCIO Garnica                              EP -                              PRIMARY      [facility]  Next Visit: 10-      CARE ([facility])   ROCIO Garnica                                                            Last  Test          Frequency    Reason                     Performed    Due Date  --------------------------------------------------------------------------------    HBA1C.......  6 months...  metFORMIN................  04- 09-    Lipid Panel.  12 months..  rosuvastatin.............  10-   09-    [facility] Embedded Care Due Messages. Reference number: [id number].   7/18/2025 8:51:00 PM CDT

## (undated) DEVICE — PAD BOVIE ADULT

## (undated) DEVICE — BANDAGE ESMARK 4X9 55514

## (undated) DEVICE — SOLUTION IRRI NS BOTTLE 1000ML R5200-01

## (undated) DEVICE — BANDAGE ACE STERILE 4 REB3114

## (undated) DEVICE — BANDAGE KERLIX   441106

## (undated) DEVICE — PAD ABD 5X9   7196D

## (undated) DEVICE — SPONGE KERLIX SUPER   NON25854

## (undated) DEVICE — TRAY LOWER EXTREMITY  SMHS029-05

## (undated) DEVICE — GAUZE VASELINE XEROFORM 5X9 8884433605

## (undated) DEVICE — WALKER ANKLE MEDIUM

## (undated) DEVICE — CUFF TOURNIQUET 18DUAL PRT 5921-218-235

## (undated) DEVICE — GLOVE BIOGEL PI ULTRA TOUCH GRAY SZ7.5